# Patient Record
Sex: FEMALE | Race: WHITE | NOT HISPANIC OR LATINO | Employment: OTHER | URBAN - METROPOLITAN AREA
[De-identification: names, ages, dates, MRNs, and addresses within clinical notes are randomized per-mention and may not be internally consistent; named-entity substitution may affect disease eponyms.]

---

## 2017-02-28 ENCOUNTER — HOSPITAL ENCOUNTER (OUTPATIENT)
Dept: RADIOLOGY | Age: 82
Discharge: HOME | End: 2017-02-28
Payer: MEDICARE

## 2017-02-28 ENCOUNTER — HOSPITAL ENCOUNTER (OUTPATIENT)
Dept: RADIOLOGY | Age: 82
Discharge: HOME | End: 2017-02-28
Admitting: FAMILY MEDICINE
Payer: MEDICARE

## 2017-02-28 ENCOUNTER — TRANSCRIBE ORDERS (OUTPATIENT)
Dept: URGENT CARE | Age: 82
End: 2017-02-28

## 2017-02-28 ENCOUNTER — OFFICE VISIT (OUTPATIENT)
Dept: URGENT CARE | Age: 82
End: 2017-02-28
Payer: MEDICARE

## 2017-02-28 DIAGNOSIS — S99.912A INJURY OF LEFT ANKLE: ICD-10-CM

## 2017-02-28 PROCEDURE — 73630 X-RAY EXAM OF FOOT: CPT

## 2017-02-28 PROCEDURE — G0463 HOSPITAL OUTPT CLINIC VISIT: HCPCS | Performed by: FAMILY MEDICINE

## 2017-02-28 PROCEDURE — 99203 OFFICE O/P NEW LOW 30 MIN: CPT | Performed by: FAMILY MEDICINE

## 2017-02-28 PROCEDURE — 73610 X-RAY EXAM OF ANKLE: CPT

## 2019-05-28 ENCOUNTER — TRANSCRIBE ORDERS (OUTPATIENT)
Dept: LAB | Facility: CLINIC | Age: 84
End: 2019-05-28

## 2019-05-28 ENCOUNTER — APPOINTMENT (OUTPATIENT)
Dept: LAB | Facility: CLINIC | Age: 84
End: 2019-05-28
Payer: MEDICARE

## 2019-05-28 DIAGNOSIS — I10 ESSENTIAL HYPERTENSION, MALIGNANT: ICD-10-CM

## 2019-05-28 DIAGNOSIS — Z79.899 ENCOUNTER FOR LONG-TERM (CURRENT) USE OF OTHER MEDICATIONS: ICD-10-CM

## 2019-05-28 DIAGNOSIS — E78.5 HYPERLIPIDEMIA, UNSPECIFIED HYPERLIPIDEMIA TYPE: ICD-10-CM

## 2019-05-28 DIAGNOSIS — E10.9 TYPE 1 DIABETES MELLITUS WITHOUT COMPLICATION (HCC): Primary | ICD-10-CM

## 2019-05-28 DIAGNOSIS — E10.9 TYPE 1 DIABETES MELLITUS WITHOUT COMPLICATION (HCC): ICD-10-CM

## 2019-05-28 LAB
ALBUMIN SERPL BCP-MCNC: 3.8 G/DL (ref 3.5–5)
ALP SERPL-CCNC: 80 U/L (ref 46–116)
ALT SERPL W P-5'-P-CCNC: 28 U/L (ref 12–78)
ANION GAP SERPL CALCULATED.3IONS-SCNC: 5 MMOL/L (ref 4–13)
AST SERPL W P-5'-P-CCNC: 16 U/L (ref 5–45)
BILIRUB SERPL-MCNC: 0.61 MG/DL (ref 0.2–1)
BUN SERPL-MCNC: 14 MG/DL (ref 5–25)
CALCIUM SERPL-MCNC: 8.9 MG/DL (ref 8.3–10.1)
CHLORIDE SERPL-SCNC: 108 MMOL/L (ref 100–108)
CO2 SERPL-SCNC: 29 MMOL/L (ref 21–32)
CREAT SERPL-MCNC: 0.72 MG/DL (ref 0.6–1.3)
EST. AVERAGE GLUCOSE BLD GHB EST-MCNC: 169 MG/DL
GFR SERPL CREATININE-BSD FRML MDRD: 76 ML/MIN/1.73SQ M
GLUCOSE P FAST SERPL-MCNC: 143 MG/DL (ref 65–99)
HBA1C MFR BLD: 7.5 % (ref 4.2–6.3)
POTASSIUM SERPL-SCNC: 3.9 MMOL/L (ref 3.5–5.3)
PROT SERPL-MCNC: 7.3 G/DL (ref 6.4–8.2)
SODIUM SERPL-SCNC: 142 MMOL/L (ref 136–145)

## 2019-05-28 PROCEDURE — 80053 COMPREHEN METABOLIC PANEL: CPT

## 2019-05-28 PROCEDURE — 83036 HEMOGLOBIN GLYCOSYLATED A1C: CPT

## 2019-05-28 PROCEDURE — 36415 COLL VENOUS BLD VENIPUNCTURE: CPT

## 2019-09-06 ENCOUNTER — OFFICE VISIT (OUTPATIENT)
Dept: URGENT CARE | Age: 84
End: 2019-09-06
Payer: MEDICARE

## 2019-09-06 VITALS
DIASTOLIC BLOOD PRESSURE: 72 MMHG | HEART RATE: 63 BPM | WEIGHT: 127 LBS | HEIGHT: 61 IN | OXYGEN SATURATION: 96 % | TEMPERATURE: 98.4 F | SYSTOLIC BLOOD PRESSURE: 186 MMHG | BODY MASS INDEX: 23.98 KG/M2

## 2019-09-06 DIAGNOSIS — S59.901A ELBOW INJURY, RIGHT, INITIAL ENCOUNTER: Primary | ICD-10-CM

## 2019-09-06 DIAGNOSIS — S09.90XA INJURY OF HEAD, INITIAL ENCOUNTER: ICD-10-CM

## 2019-09-06 PROCEDURE — 99213 OFFICE O/P EST LOW 20 MIN: CPT | Performed by: FAMILY MEDICINE

## 2019-09-06 PROCEDURE — G0463 HOSPITAL OUTPT CLINIC VISIT: HCPCS | Performed by: FAMILY MEDICINE

## 2019-09-06 RX ORDER — ASPIRIN 81 MG/1
TABLET ORAL
COMMUNITY
End: 2020-07-02 | Stop reason: HOSPADM

## 2019-09-06 RX ORDER — ESCITALOPRAM OXALATE 10 MG/1
10 TABLET ORAL DAILY
Refills: 0 | COMMUNITY
Start: 2019-07-06

## 2019-09-06 RX ORDER — AMLODIPINE BESYLATE 5 MG/1
10 TABLET ORAL DAILY
Refills: 0 | Status: ON HOLD | COMMUNITY
Start: 2019-08-19 | End: 2020-07-02 | Stop reason: SDUPTHER

## 2019-09-06 RX ORDER — CALCIUM CARBONATE/VITAMIN D3 500-10/5ML
LIQUID (ML) ORAL
COMMUNITY

## 2019-09-06 RX ORDER — FELODIPINE 5 MG/1
TABLET, EXTENDED RELEASE ORAL
COMMUNITY
End: 2020-07-02 | Stop reason: HOSPADM

## 2019-09-06 RX ORDER — LISINOPRIL 40 MG/1
40 TABLET ORAL 2 TIMES DAILY
COMMUNITY
End: 2020-07-02 | Stop reason: HOSPADM

## 2019-09-06 RX ORDER — ATORVASTATIN CALCIUM 40 MG/1
TABLET, FILM COATED ORAL
COMMUNITY

## 2019-09-06 RX ORDER — METOPROLOL SUCCINATE 25 MG/1
TABLET, EXTENDED RELEASE ORAL
COMMUNITY

## 2019-09-06 RX ORDER — CLOPIDOGREL BISULFATE 75 MG/1
TABLET ORAL
COMMUNITY
End: 2020-07-02 | Stop reason: HOSPADM

## 2019-09-06 NOTE — PROGRESS NOTES
[facility]        NAME: Annette Egan is a 80 y o  female  : 1931    MRN: [id number]  DATE: 2019  TIME: 8:01 PM    Assessment and Plan   Elbow injury, right, initial encounter [D02 901A]  1  Elbow injury, right, initial encounter  Transfer to other facility   2  Injury of head, initial encounter  Transfer to other facility         Patient Instructions     Due to patient hitting head during fall and use of Plavix recommend patient go to ER for evaluation  Son will drive her  Patient would like to go to [facility] ER  Chief Complaint     Chief Complaint   Patient presents with    Elbow Injury     Shital Ramirez this morning in her living room, right elbow injury  History of Present Illness       Patient presents for evaluation of elbow pain after a fall this morning  She states she was cleaning up her coffee table and she lost balance and fell hitting her head and elbow  She was dizzy initially after the injury but is not dizzy not  She denies any headaches, visual disturbances  According to some her confusion is baseline for her  She complains of no pain in her head  She complains of right elbow pain since the fall  She is right handed  She has not been able to use her arm much all day  She has a caretaker that lives with her and they called her son this evening stating she was in a lot of pain  She took an ibuprofen and put ice on it until he got there  Her son states she was crying due to the pain  In the office tonight she states her pain is better  She denies any numbness or tingling  She denies pain elsewhere  Her pain is over the radial head  She is on Plavix due to a history of a stroke  She took her BP medication on the way over  Review of Systems   Review of Systems   Constitutional: Negative for chills and fever  HENT: Negative  Eyes: Negative for photophobia and visual disturbance  Respiratory: Negative for cough, shortness of breath and wheezing  Cardiovascular: Negative for chest pain  Musculoskeletal: Positive for arthralgias and joint swelling  Negative for neck pain  No head pain   Skin: Positive for color change  Bruise  on elbow   Neurological: Negative for dizziness, weakness, light-headedness, numbness and headaches  Psychiatric/Behavioral: Negative  Negative for confusion  Current Medications       Current Outpatient Medications:     amLODIPine (NORVASC) 5 mg tablet, Take 5 mg by mouth daily, Disp: , Rfl: 0    aspirin (ECOTRIN LOW STRENGTH) 81 mg EC tablet, Take by mouth, Disp: , Rfl:     atorvastatin (LIPITOR) 40 mg tablet, Take by mouth, Disp: , Rfl:     Calcium Carb-Cholecalciferol (CALTRATE 600+D) 600-800 MG-UNIT TABS, Take by mouth, Disp: , Rfl:     clopidogrel (PLAVIX) 75 mg tablet, Take by mouth, Disp: , Rfl:     escitalopram (LEXAPRO) 10 mg tablet, Take 10 mg by mouth daily, Disp: , Rfl: 0    felodipine (PLENDIL) 5 mg 24 hr tablet, Take by mouth, Disp: , Rfl:     lisinopril (ZESTRIL) 40 mg tablet, Take by mouth, Disp: , Rfl:     Magnesium Oxide 400 MG CAPS, Take by mouth, Disp: , Rfl:     metoprolol succinate (TOPROL-XL) 25 mg 24 hr tablet, Take by mouth, Disp: , Rfl:     Current Allergies     Allergies as of 09/06/2019    (No Known Allergies)            The following portions of the patient's history were reviewed and updated as appropriate: allergies, current medications, past family history, past medical history, past social history, past surgical history and problem list      Past Medical History:   Diagnosis Date    High cholesterol     Hypertension        History reviewed  No pertinent surgical history  History reviewed  No pertinent family history  Medications have been verified          Objective   BP (!) 186/72 (BP Location: Left arm, Patient Position: Sitting, Cuff Size: Standard)   Pulse 63   Temp 98 4 °F (36 9 °C)   Ht 5' 1" (1 549 m)   Wt 57 6 kg (127 lb)   SpO2 96%   BMI 24 00 kg/m²        Physical Exam     Physical Exam   Constitutional: She is oriented to person, place, and time  She appears well-developed and well-nourished  No distress  HENT:   Head: Normocephalic  Mouth/Throat: Oropharynx is clear and moist    Eyes: Pupils are equal, round, and reactive to light  EOM are normal    Neck: Normal range of motion  Cardiovascular: Normal rate and regular rhythm  No murmur heard  Pulmonary/Chest: Effort normal and breath sounds normal  No respiratory distress  She has no wheezes  She has no rales  Musculoskeletal: She exhibits edema and tenderness  Edema, ecchymosis over lateral elbow  Full elbow ROM with pain  Sensation median, ulnar, radial nerves intact  Palpable radial pulse     Neurological: She is alert and oriented to person, place, and time  No cranial nerve deficit or sensory deficit  Coordination normal    Skin: Skin is warm and dry  She is not diaphoretic  Psychiatric: She has a normal mood and affect  Her behavior is normal    Nursing note and vitals reviewed

## 2019-11-22 ENCOUNTER — APPOINTMENT (OUTPATIENT)
Dept: LAB | Facility: CLINIC | Age: 84
End: 2019-11-22
Payer: MEDICARE

## 2019-11-22 ENCOUNTER — TRANSCRIBE ORDERS (OUTPATIENT)
Dept: LAB | Facility: CLINIC | Age: 84
End: 2019-11-22

## 2019-11-22 DIAGNOSIS — I10 ESSENTIAL HYPERTENSION, MALIGNANT: ICD-10-CM

## 2019-11-22 DIAGNOSIS — E78.5 HYPERLIPIDEMIA, UNSPECIFIED HYPERLIPIDEMIA TYPE: ICD-10-CM

## 2019-11-22 DIAGNOSIS — Z79.899 ENCOUNTER FOR LONG-TERM (CURRENT) USE OF OTHER MEDICATIONS: ICD-10-CM

## 2019-11-22 DIAGNOSIS — E13.69 OTHER SPECIFIED DIABETES MELLITUS WITH OTHER SPECIFIED COMPLICATION, UNSPECIFIED WHETHER LONG TERM INSULIN USE (HCC): Primary | ICD-10-CM

## 2019-11-22 DIAGNOSIS — E13.69 OTHER SPECIFIED DIABETES MELLITUS WITH OTHER SPECIFIED COMPLICATION, UNSPECIFIED WHETHER LONG TERM INSULIN USE (HCC): ICD-10-CM

## 2019-11-22 LAB
ALBUMIN SERPL BCP-MCNC: 3.9 G/DL (ref 3.5–5)
ALP SERPL-CCNC: 85 U/L (ref 46–116)
ALT SERPL W P-5'-P-CCNC: 24 U/L (ref 12–78)
ANION GAP SERPL CALCULATED.3IONS-SCNC: 6 MMOL/L (ref 4–13)
AST SERPL W P-5'-P-CCNC: 14 U/L (ref 5–45)
BACTERIA UR QL AUTO: ABNORMAL /HPF
BILIRUB SERPL-MCNC: 0.57 MG/DL (ref 0.2–1)
BILIRUB UR QL STRIP: NEGATIVE
BUN SERPL-MCNC: 13 MG/DL (ref 5–25)
CALCIUM SERPL-MCNC: 9.7 MG/DL (ref 8.3–10.1)
CHLORIDE SERPL-SCNC: 107 MMOL/L (ref 100–108)
CHOLEST SERPL-MCNC: 180 MG/DL (ref 50–200)
CK SERPL-CCNC: 59 U/L (ref 26–192)
CLARITY UR: CLEAR
CO2 SERPL-SCNC: 30 MMOL/L (ref 21–32)
COLOR UR: YELLOW
CREAT SERPL-MCNC: 0.67 MG/DL (ref 0.6–1.3)
CREAT UR-MCNC: 147 MG/DL
EST. AVERAGE GLUCOSE BLD GHB EST-MCNC: 163 MG/DL
GFR SERPL CREATININE-BSD FRML MDRD: 79 ML/MIN/1.73SQ M
GLUCOSE P FAST SERPL-MCNC: 140 MG/DL (ref 65–99)
GLUCOSE UR STRIP-MCNC: NEGATIVE MG/DL
HBA1C MFR BLD: 7.3 % (ref 4.2–6.3)
HDLC SERPL-MCNC: 50 MG/DL
HGB UR QL STRIP.AUTO: ABNORMAL
KETONES UR STRIP-MCNC: NEGATIVE MG/DL
LDLC SERPL CALC-MCNC: 115 MG/DL (ref 0–100)
LEUKOCYTE ESTERASE UR QL STRIP: ABNORMAL
MICROALBUMIN UR-MCNC: 21 MG/L (ref 0–20)
MICROALBUMIN/CREAT 24H UR: 14 MG/G CREATININE (ref 0–30)
NITRITE UR QL STRIP: NEGATIVE
NON-SQ EPI CELLS URNS QL MICRO: ABNORMAL /HPF
NONHDLC SERPL-MCNC: 130 MG/DL
PH UR STRIP.AUTO: 7 [PH]
POTASSIUM SERPL-SCNC: 4.4 MMOL/L (ref 3.5–5.3)
PROT SERPL-MCNC: 7.5 G/DL (ref 6.4–8.2)
PROT UR STRIP-MCNC: NEGATIVE MG/DL
RBC #/AREA URNS AUTO: ABNORMAL /HPF
SODIUM SERPL-SCNC: 143 MMOL/L (ref 136–145)
SP GR UR STRIP.AUTO: 1.02 (ref 1–1.03)
TRIGL SERPL-MCNC: 74 MG/DL
UROBILINOGEN UR QL STRIP.AUTO: 0.2 E.U./DL
WBC #/AREA URNS AUTO: ABNORMAL /HPF

## 2019-11-22 PROCEDURE — 81001 URINALYSIS AUTO W/SCOPE: CPT

## 2019-11-22 PROCEDURE — 82043 UR ALBUMIN QUANTITATIVE: CPT

## 2019-11-22 PROCEDURE — 83036 HEMOGLOBIN GLYCOSYLATED A1C: CPT

## 2019-11-22 PROCEDURE — 80061 LIPID PANEL: CPT

## 2019-11-22 PROCEDURE — 82570 ASSAY OF URINE CREATININE: CPT

## 2019-11-22 PROCEDURE — 82550 ASSAY OF CK (CPK): CPT

## 2019-11-22 PROCEDURE — 80053 COMPREHEN METABOLIC PANEL: CPT

## 2019-11-22 PROCEDURE — 36415 COLL VENOUS BLD VENIPUNCTURE: CPT

## 2020-07-01 ENCOUNTER — HOSPITAL ENCOUNTER (EMERGENCY)
Facility: HOSPITAL | Age: 85
End: 2020-07-01
Attending: EMERGENCY MEDICINE
Payer: MEDICARE

## 2020-07-01 ENCOUNTER — HOSPITAL ENCOUNTER (INPATIENT)
Facility: HOSPITAL | Age: 85
LOS: 1 days | Discharge: HOME HEALTH | DRG: 087 | End: 2020-07-02
Attending: SURGERY | Admitting: SURGERY
Payer: MEDICARE

## 2020-07-01 ENCOUNTER — APPOINTMENT (EMERGENCY)
Dept: CT IMAGING | Facility: HOSPITAL | Age: 85
End: 2020-07-01
Payer: MEDICARE

## 2020-07-01 ENCOUNTER — APPOINTMENT (EMERGENCY)
Dept: RADIOLOGY | Facility: HOSPITAL | Age: 85
End: 2020-07-01
Payer: MEDICARE

## 2020-07-01 VITALS
HEART RATE: 66 BPM | SYSTOLIC BLOOD PRESSURE: 146 MMHG | RESPIRATION RATE: 16 BRPM | BODY MASS INDEX: 23.98 KG/M2 | WEIGHT: 126.98 LBS | TEMPERATURE: 98.3 F | OXYGEN SATURATION: 95 % | HEIGHT: 61 IN | DIASTOLIC BLOOD PRESSURE: 65 MMHG

## 2020-07-01 DIAGNOSIS — S06.350A: Primary | ICD-10-CM

## 2020-07-01 DIAGNOSIS — S06.330A: Primary | ICD-10-CM
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
## 2020-07-01 PROBLEM — S06.32AA INTRAPARENCHYMAL HEMATOMA OF LEFT SIDE OF BRAIN DUE TO TRAUMA: Status: ACTIVE | Noted: 2020-07-01

## 2020-07-01 LAB
ALBUMIN SERPL BCP-MCNC: 3.9 G/DL (ref 3.5–5)
ALP SERPL-CCNC: 92 U/L (ref 46–116)
ALT SERPL W P-5'-P-CCNC: 22 U/L (ref 12–78)
ANION GAP SERPL CALCULATED.3IONS-SCNC: 8 MMOL/L (ref 4–13)
APTT PPP: 26 SECONDS (ref 23–37)
AST SERPL W P-5'-P-CCNC: 21 U/L (ref 5–45)
ATRIAL RATE: 66 BPM
BACTERIA UR QL AUTO: ABNORMAL /HPF
BASOPHILS # BLD AUTO: 0.07 THOUSANDS/ΜL (ref 0–0.1)
BASOPHILS NFR BLD AUTO: 1 % (ref 0–1)
BILIRUB SERPL-MCNC: 0.61 MG/DL (ref 0.2–1)
BILIRUB UR QL STRIP: ABNORMAL
BUN SERPL-MCNC: 13 MG/DL (ref 5–25)
CALCIUM SERPL-MCNC: 9.6 MG/DL (ref 8.3–10.1)
CHLORIDE SERPL-SCNC: 100 MMOL/L (ref 100–108)
CLARITY UR: CLEAR
CO2 SERPL-SCNC: 28 MMOL/L (ref 21–32)
COLOR UR: YELLOW
CREAT SERPL-MCNC: 0.81 MG/DL (ref 0.6–1.3)
EOSINOPHIL # BLD AUTO: 0.14 THOUSAND/ΜL (ref 0–0.61)
EOSINOPHIL NFR BLD AUTO: 2 % (ref 0–6)
ERYTHROCYTE [DISTWIDTH] IN BLOOD BY AUTOMATED COUNT: 13.6 % (ref 11.6–15.1)
GFR SERPL CREATININE-BSD FRML MDRD: 65 ML/MIN/1.73SQ M
GLUCOSE SERPL-MCNC: 198 MG/DL (ref 65–140)
GLUCOSE UR STRIP-MCNC: NEGATIVE MG/DL
HCT VFR BLD AUTO: 42.2 % (ref 34.8–46.1)
HGB BLD-MCNC: 13.6 G/DL (ref 11.5–15.4)
HGB UR QL STRIP.AUTO: NEGATIVE
IMM GRANULOCYTES # BLD AUTO: 0.03 THOUSAND/UL (ref 0–0.2)
IMM GRANULOCYTES NFR BLD AUTO: 0 % (ref 0–2)
INR PPP: 1.04 (ref 0.84–1.19)
KETONES UR STRIP-MCNC: ABNORMAL MG/DL
LEUKOCYTE ESTERASE UR QL STRIP: ABNORMAL
LYMPHOCYTES # BLD AUTO: 2.02 THOUSANDS/ΜL (ref 0.6–4.47)
LYMPHOCYTES NFR BLD AUTO: 26 % (ref 14–44)
MCH RBC QN AUTO: 30 PG (ref 26.8–34.3)
MCHC RBC AUTO-ENTMCNC: 32.2 G/DL (ref 31.4–37.4)
MCV RBC AUTO: 93 FL (ref 82–98)
MONOCYTES # BLD AUTO: 0.65 THOUSAND/ΜL (ref 0.17–1.22)
MONOCYTES NFR BLD AUTO: 8 % (ref 4–12)
NEUTROPHILS # BLD AUTO: 4.92 THOUSANDS/ΜL (ref 1.85–7.62)
NEUTS SEG NFR BLD AUTO: 63 % (ref 43–75)
NITRITE UR QL STRIP: NEGATIVE
NON-SQ EPI CELLS URNS QL MICRO: ABNORMAL /HPF
NRBC BLD AUTO-RTO: 0 /100 WBCS
P AXIS: 40 DEGREES
PH UR STRIP.AUTO: 6 [PH] (ref 4.5–8)
PLATELET # BLD AUTO: 262 THOUSANDS/UL (ref 149–390)
PMV BLD AUTO: 9.1 FL (ref 8.9–12.7)
POTASSIUM SERPL-SCNC: 3.9 MMOL/L (ref 3.5–5.3)
PR INTERVAL: 174 MS
PROT SERPL-MCNC: 7.7 G/DL (ref 6.4–8.2)
PROT UR STRIP-MCNC: ABNORMAL MG/DL
PROTHROMBIN TIME: 13 SECONDS (ref 11.6–14.5)
QRS AXIS: 37 DEGREES
QRSD INTERVAL: 84 MS
QT INTERVAL: 394 MS
QTC INTERVAL: 413 MS
RBC # BLD AUTO: 4.53 MILLION/UL (ref 3.81–5.12)
RBC #/AREA URNS AUTO: ABNORMAL /HPF
SODIUM SERPL-SCNC: 136 MMOL/L (ref 136–145)
SP GR UR STRIP.AUTO: 1.02 (ref 1–1.03)
T WAVE AXIS: 38 DEGREES
TROPONIN I SERPL-MCNC: <0.02 NG/ML
UROBILINOGEN UR QL STRIP.AUTO: 0.2 E.U./DL
VENTRICULAR RATE: 66 BPM
WBC # BLD AUTO: 7.83 THOUSAND/UL (ref 4.31–10.16)
WBC #/AREA URNS AUTO: ABNORMAL /HPF

## 2020-07-01 PROCEDURE — 1123F ACP DISCUSS/DSCN MKR DOCD: CPT | Performed by: NEUROLOGICAL SURGERY

## 2020-07-01 PROCEDURE — 71046 X-RAY EXAM CHEST 2 VIEWS: CPT

## 2020-07-01 PROCEDURE — 85730 THROMBOPLASTIN TIME PARTIAL: CPT | Performed by: PHYSICIAN ASSISTANT

## 2020-07-01 PROCEDURE — 85025 COMPLETE CBC W/AUTO DIFF WBC: CPT | Performed by: PHYSICIAN ASSISTANT

## 2020-07-01 PROCEDURE — 36415 COLL VENOUS BLD VENIPUNCTURE: CPT | Performed by: PHYSICIAN ASSISTANT

## 2020-07-01 PROCEDURE — 85610 PROTHROMBIN TIME: CPT | Performed by: PHYSICIAN ASSISTANT

## 2020-07-01 PROCEDURE — 87040 BLOOD CULTURE FOR BACTERIA: CPT | Performed by: PHYSICIAN ASSISTANT

## 2020-07-01 PROCEDURE — 93010 ELECTROCARDIOGRAM REPORT: CPT | Performed by: INTERNAL MEDICINE

## 2020-07-01 PROCEDURE — 70450 CT HEAD/BRAIN W/O DYE: CPT

## 2020-07-01 PROCEDURE — 80053 COMPREHEN METABOLIC PANEL: CPT | Performed by: PHYSICIAN ASSISTANT

## 2020-07-01 PROCEDURE — 81001 URINALYSIS AUTO W/SCOPE: CPT

## 2020-07-01 PROCEDURE — 99285 EMERGENCY DEPT VISIT HI MDM: CPT | Performed by: PHYSICIAN ASSISTANT

## 2020-07-01 PROCEDURE — 99285 EMERGENCY DEPT VISIT HI MDM: CPT

## 2020-07-01 PROCEDURE — 99221 1ST HOSP IP/OBS SF/LOW 40: CPT | Performed by: SURGERY

## 2020-07-01 PROCEDURE — 93005 ELECTROCARDIOGRAM TRACING: CPT

## 2020-07-01 PROCEDURE — 84484 ASSAY OF TROPONIN QUANT: CPT | Performed by: PHYSICIAN ASSISTANT

## 2020-07-01 RX ORDER — B-COMPLEX WITH VITAMIN C
1 TABLET ORAL
Status: DISCONTINUED | OUTPATIENT
Start: 2020-07-02 | End: 2020-07-02 | Stop reason: HOSPADM

## 2020-07-01 RX ORDER — OXYCODONE HYDROCHLORIDE 5 MG/1
2.5 TABLET ORAL EVERY 4 HOURS PRN
Status: DISCONTINUED | OUTPATIENT
Start: 2020-07-01 | End: 2020-07-02 | Stop reason: HOSPADM

## 2020-07-01 RX ORDER — DOCUSATE SODIUM 100 MG/1
100 CAPSULE, LIQUID FILLED ORAL 2 TIMES DAILY
Status: DISCONTINUED | OUTPATIENT
Start: 2020-07-01 | End: 2020-07-02 | Stop reason: HOSPADM

## 2020-07-01 RX ORDER — AMOXICILLIN 250 MG
1 CAPSULE ORAL 2 TIMES DAILY
Status: DISCONTINUED | OUTPATIENT
Start: 2020-07-01 | End: 2020-07-02 | Stop reason: HOSPADM

## 2020-07-01 RX ORDER — METOPROLOL SUCCINATE 25 MG/1
25 TABLET, EXTENDED RELEASE ORAL DAILY
Status: DISCONTINUED | OUTPATIENT
Start: 2020-07-02 | End: 2020-07-02 | Stop reason: HOSPADM

## 2020-07-01 RX ORDER — ATORVASTATIN CALCIUM 40 MG/1
40 TABLET, FILM COATED ORAL
Status: DISCONTINUED | OUTPATIENT
Start: 2020-07-02 | End: 2020-07-02 | Stop reason: HOSPADM

## 2020-07-01 RX ORDER — ACETAMINOPHEN 325 MG/1
975 TABLET ORAL EVERY 8 HOURS SCHEDULED
Status: DISCONTINUED | OUTPATIENT
Start: 2020-07-01 | End: 2020-07-02 | Stop reason: HOSPADM

## 2020-07-01 RX ORDER — ONDANSETRON 2 MG/ML
4 INJECTION INTRAMUSCULAR; INTRAVENOUS EVERY 6 HOURS PRN
Status: DISCONTINUED | OUTPATIENT
Start: 2020-07-01 | End: 2020-07-02 | Stop reason: HOSPADM

## 2020-07-01 RX ORDER — AMLODIPINE BESYLATE 10 MG/1
10 TABLET ORAL DAILY
Status: DISCONTINUED | OUTPATIENT
Start: 2020-07-02 | End: 2020-07-02 | Stop reason: HOSPADM

## 2020-07-01 RX ORDER — ESCITALOPRAM OXALATE 10 MG/1
10 TABLET ORAL DAILY
Status: DISCONTINUED | OUTPATIENT
Start: 2020-07-02 | End: 2020-07-02 | Stop reason: HOSPADM

## 2020-07-01 RX ADMIN — DESMOPRESSIN ACETATE 17.2 MCG: 4 SOLUTION INTRAVENOUS at 17:37

## 2020-07-01 NOTE — ED NOTES
Patient ambulated to bathroom with assistance x1  No complaints, steady gait, no immediate distress noted       April Rodolfo Goldstein RN  07/01/20 1156       April JODEE Escobar RN  07/01/20 0806

## 2020-07-01 NOTE — ED NOTES
PACS called - 2pm pickup time to the ED for Trauma  Dr Bandar Escalona accepting   Call [address], RN  07/01/20 1800

## 2020-07-01 NOTE — ED PROVIDER NOTES
History  Chief Complaint   Patient presents with    Fall     pt states was dizzy fell last night hit L side of forehead and eye pt is on a blood thinner     80year-old female presents to the emergency department after a fall  States she fell yesterday in her home but does not remember the details leading to the fall  Bedside states that he went over there after she had fallen she had been complaining of some back pain and seemed unsteady on her feet  Notes some bruising on the right side of her forehead above the left eye  Denies any headaches at this time  No chest pain presently  Had complained of some mild pain in the middle of her back yesterday which has since resolved  History provided by:  Patient and relative   used: No    Fall   Mechanism of injury: fall    Injury location:  Head/neck  Head/neck injury location:  Head  Incident location:  Home  Time since incident:  1 day  Arrived directly from scene: no    Fall:     Fall occurred:  Unable to [employer] of impact:  Unable to specify  Suspicion of alcohol use: no    Suspicion of drug use: no    Tetanus status:  Unknown  Associated symptoms: back pain    Associated symptoms: no abdominal pain, no chest pain, no headaches, no nausea, no seizures and no vomiting        Prior to Admission Medications   Prescriptions Last Dose Informant Patient Reported? Taking?    Calcium Carb-Cholecalciferol (CALTRATE 600+D) 600-800 MG-UNIT TABS   Yes No   Sig: Take by mouth   Magnesium Oxide 400 MG CAPS   Yes No   Sig: Take by mouth   amLODIPine (NORVASC) 5 mg tablet   Yes No   Sig: Take 5 mg by mouth daily   aspirin (ECOTRIN LOW STRENGTH) 81 mg EC tablet   Yes No   Sig: Take by mouth   atorvastatin (LIPITOR) 40 mg tablet   Yes No   Sig: Take by mouth   clopidogrel (PLAVIX) 75 mg tablet   Yes No   Sig: Take by mouth   escitalopram (LEXAPRO) 10 mg tablet   Yes No   Sig: Take 10 mg by mouth daily   felodipine (PLENDIL) 5 mg 24 hr tablet Yes No   Sig: Take by mouth   lisinopril (ZESTRIL) 40 mg tablet   Yes No   Sig: Take by mouth   metoprolol succinate (TOPROL-XL) 25 mg 24 hr tablet   Yes No   Sig: Take by mouth      Facility-Administered Medications: None       Past Medical History:   Diagnosis Date    High cholesterol     Hypertension        History reviewed  No pertinent surgical history  History reviewed  No pertinent family history  I have reviewed and agree with the history as documented  E-Cigarette/Vaping     E-Cigarette/Vaping Substances     Social History     Tobacco Use    Smoking status: Never Smoker    Smokeless tobacco: Never Used   Substance Use Topics    Alcohol use: Never     Frequency: Never    Drug use: Never       Review of Systems   Constitutional: Negative for activity change, appetite change, chills and fever  HENT: Negative for congestion, dental problem, drooling, ear discharge, ear pain, mouth sores, nosebleeds, rhinorrhea, sore throat and trouble swallowing  Eyes: Negative for pain, discharge and itching  Respiratory: Negative for cough, chest tightness, shortness of breath and wheezing  Cardiovascular: Negative for chest pain and palpitations  Gastrointestinal: Negative for abdominal pain, blood in stool, constipation, diarrhea, nausea and vomiting  Endocrine: Negative for cold intolerance and heat intolerance  Genitourinary: Negative for difficulty urinating, dysuria, flank pain, frequency and urgency  Musculoskeletal: Positive for back pain  Skin: Positive for color change (bruising)  Negative for rash and wound  Allergic/Immunologic: Negative for food allergies and immunocompromised state  Neurological: Positive for dizziness  Negative for seizures, syncope, weakness, numbness and headaches  Psychiatric/Behavioral: Negative for agitation, behavioral problems and confusion  Physical Exam  Physical Exam   Constitutional: She is oriented to person, place, and time   She appears well-developed and well-nourished  No distress  HENT:   Head: Normocephalic and atraumatic  Right Ear: External ear normal    Left Ear: External ear normal    Mouth/Throat: Oropharynx is clear and moist  No oropharyngeal exudate  Eyes: Pupils are equal, round, and reactive to light  Conjunctivae and EOM are normal    Neck: No JVD present  No tracheal deviation present  Cardiovascular: Normal rate, regular rhythm and normal heart sounds  Exam reveals no gallop and no friction rub  No murmur heard  Pulmonary/Chest: Effort normal and breath sounds normal  No respiratory distress  She has no wheezes  She has no rales  She exhibits no tenderness  Musculoskeletal: Normal range of motion  She exhibits no edema, tenderness or deformity  No midline cervical, thoracic, or lumbar tenderness  Lymphadenopathy:     She has no cervical adenopathy  Neurological: She is alert and oriented to person, place, and time  Skin: Skin is warm and dry  No rash noted  She is not diaphoretic  No erythema  Psychiatric: She has a normal mood and affect  Her behavior is normal    Nursing note and vitals reviewed        Vital Signs  ED Triage Vitals   Temperature Pulse Respirations Blood Pressure SpO2   07/01/20 1110 07/01/20 1110 07/01/20 1110 07/01/20 1110 07/01/20 1110   98 3 °F (36 8 °C) 70 16 164/75 97 %      Temp Source Heart Rate Source Patient Position - Orthostatic VS BP Location FiO2 (%)   07/01/20 1110 07/01/20 1302 07/01/20 1302 07/01/20 1302 --   Oral Monitor Lying Right arm       Pain Score       07/01/20 1110       No Pain           Vitals:    07/01/20 1110 07/01/20 1302   BP: 164/75 141/67   Pulse: 70 62   Patient Position - Orthostatic VS:  Lying         Visual Acuity      ED Medications  Medications - No data to display    Diagnostic Studies  Results Reviewed     Procedure Component Value Units Date/Time    CBC and differential [[id number]] Collected:  07/01/20 1204    Lab Status:  Final result Specimen: Blood from Arm, Left Updated:  07/01/20 1240     WBC 7 83 Thousand/uL      RBC 4 53 Million/uL      Hemoglobin 13 6 g/dL      Hematocrit 42 2 %      MCV 93 fL      MCH 30 0 pg      MCHC 32 2 g/dL      RDW 13 6 %      MPV 9 1 fL      Platelets 485 Thousands/uL      nRBC 0 /100 WBCs      Neutrophils Relative 63 %      Immat GRANS % 0 %      Lymphocytes Relative 26 %      Monocytes Relative 8 %      Eosinophils Relative 2 %      Basophils Relative 1 %      Neutrophils Absolute 4 92 Thousands/µL      Immature Grans Absolute 0 03 Thousand/uL      Lymphocytes Absolute 2 02 Thousands/µL      Monocytes Absolute 0 65 Thousand/µL      Eosinophils Absolute 0 14 Thousand/µL      Basophils Absolute 0 07 Thousands/µL     Comprehensive metabolic panel [[id number]]  (Abnormal) Collected:  07/01/20 1204    Lab Status:  Final result Specimen:  Blood from Arm, Left Updated:  07/01/20 1239     Sodium 136 mmol/L      Potassium 3 9 mmol/L      Chloride 100 mmol/L      CO2 28 mmol/L      ANION GAP 8 mmol/L      BUN 13 mg/dL      Creatinine 0 81 mg/dL      Glucose 198 mg/dL      Calcium 9 6 mg/dL      AST 21 U/L      ALT 22 U/L      Alkaline Phosphatase 92 U/L      Total Protein 7 7 g/dL      Albumin 3 9 g/dL      Total Bilirubin 0 61 mg/dL      eGFR 65 ml/min/1 73sq m     Narrative:       [place] guidelines for Chronic Kidney Disease (CKD):     Stage 1 with normal or high GFR (GFR > 90 mL/min/1 73 square meters)    Stage 2 Mild CKD (GFR = 60-89 mL/min/1 73 square meters)    Stage 3A Moderate CKD (GFR = 45-59 mL/min/1 73 square meters)    Stage 3B Moderate CKD (GFR = 30-44 mL/min/1 73 square meters)    Stage 4 Severe CKD (GFR = 15-29 mL/min/1 73 square meters)    Stage 5 End Stage CKD (GFR <15 mL/min/1 73 square meters)  Note: GFR calculation is accurate only with a steady state creatinine    Troponin I [[id number]]  (Normal) Collected:  07/01/20 1204    Lab Status:  Final result Specimen:  Blood from Arm, Left Updated:  07/01/20 1236     Troponin I <0 02 ng/mL     Protime-INR [[id number]]  (Normal) Collected:  07/01/20 1204    Lab Status:  Final result Specimen:  Blood from Arm, Left Updated:  07/01/20 1233     Protime 13 0 seconds      INR 1 04    APTT [[id number]]  (Normal) Collected:  07/01/20 1204    Lab Status:  Final result Specimen:  Blood from Arm, Left Updated:  07/01/20 1233     PTT 26 seconds     Urine Microscopic [[id number]] Collected:  07/01/20 1215    Lab Status: In process Specimen:  Urine, Clean Catch Updated:  07/01/20 1219    Blood culture #1 [[id number]] Collected:  07/01/20 1209    Lab Status: In process Specimen:  Blood from Arm, Right Updated:  07/01/20 1215    Urine Macroscopic, POC [[id number]]  (Abnormal) Collected:  07/01/20 1226    Lab Status:  Final result Specimen:  Urine Updated:  07/01/20 1212     Color, UA Yellow     Clarity, UA Clear     pH, UA 6 0     Leukocytes, UA Trace     Nitrite, UA Negative     Protein, UA Trace mg/dl      Glucose, UA Negative mg/dl      Ketones, UA Trace mg/dl      Urobilinogen, UA 0 2 E U /dl      Bilirubin, UA Interference- unable to analyze     Blood, UA Negative     Specific Gravity, UA 1 025    Narrative:       CLINITEK RESULT    Blood culture #2 [[id number]] Collected:  07/01/20 1204    Lab Status: In process Specimen:  Blood from Arm, Left Updated:  07/01/20 1211                 CT head without contrast   Final Result by Charleen Boyer MD (07/01 1256)      Tiny hemorrhagic contusion in the peripheral left occipital lobe  No mass effect  Diffuse atrophy with chronic small vessel ischemic changes                 I personally discussed this study with RADHA PEREZ on 7/1/2020 at 12:55 PM                            Workstation performed: [id number]         XR chest 2 views    (Results Pending)              Procedures  ECG 12 Lead Documentation Only  Date/Time: 7/1/2020 12:10 PM  Performed by: Wilberto Reyna PA-C  Authorized by: Wilberto Reyna PA-C Indications / Diagnosis:  Dizziness  ECG reviewed by me, the ED Provider: yes    Patient location:  ED  Previous ECG:     Previous ECG:  Unavailable  Interpretation:     Interpretation: normal    Rate:     ECG rate:  66    ECG rate assessment: normal    Rhythm:     Rhythm: sinus rhythm    Ectopy:     Ectopy: none    QRS:     QRS axis:  Normal    QRS intervals:  Normal  Conduction:     Conduction: normal    ST segments:     ST segments:  Normal  T waves:     T waves: normal               ED Course       US AUDIT      Most Recent Value   Initial Alcohol Screen: US AUDIT-C    1  How often do you have a drink containing alcohol? 1 Filed at: 07/01/2020 1111   2  How many drinks containing alcohol do you have on a typical day you are drinking? 0 Filed at: 07/01/2020 1111   Audit-C Score  1 Filed at: 07/01/2020 1111                  JENNIFER/DAST-10      Most Recent Value   How many times in the past year have you    Used an illegal drug or used a prescription medication for non-medical reasons? Never Filed at: 07/01/2020 1112                                MDM  Number of Diagnoses or Management Options  Fall, initial encounter:   Focal contusion of occipital lobe [facility]):   Diagnosis management comments: Differential diagnosis includes but not limited to:  TIA, CVA, closed-head injury, intracranial injury, acute coronary syndrome, coronary event           Amount and/or Complexity of Data Reviewed  Clinical lab tests: ordered and reviewed  Tests in the radiology section of CPT®: ordered and reviewed  Discuss the patient with other providers: yes          Disposition  Final diagnoses:   Focal contusion of occipital lobe ([address] )   Fall, initial encounter     Time reflects when diagnosis was documented in both MDM as applicable and the Disposition within this note     Time User Action Codes Description Comment    7/1/2020  1:07 PM Leonie Servin Focal contusion of occipital lobe ([facility] [address] )     7/1/2020  1:07 PM Annette Gonzales Add [N15  Thaddeus Spencer, initial encounter       ED Disposition     ED Disposition Condition Date/Time Comment    Transfer to [id number] Jul 1, 2020  1:07 PM Zaid Leary should be transferred out to [facility] and has been medically cleared  MD Documentation      Most Recent Value   Patient Condition  The patient has been stabilized such that within reasonable medical probability, no material deterioration of the patient condition or the condition of the unborn child(sharon) is likely to result from the transfer   Reason for Transfer  Level of Care needed not available at this facility   Benefits of Transfer  Specialized equipment and/or services available at the receiving facility (Include comment)________________________   Risks of Transfer  Potential for delay in receiving treatment, Loss of IV   Accepting Physician  Dr Deysi Steeleh Name, Brenda Cabrera   Sending MD Konstantin Perdue PA-C,  Dr Jenni Valentine   Provider Certification  General risk, such as traffic hazards, adverse weather conditions, rough terrain or turbulence, possible failure of equipment (including vehicle or aircraft), or consequences of actions of persons outside the control of the transport personnel      RN Documentation      Most [address] [facility] Name, [address]   SLB      Follow-up Information    None         Patient's Medications   Discharge Prescriptions    No medications on file     No discharge procedures on file      PDMP Review     None          ED Provider  Electronically Signed by           Malia Thomas PA-C  07/01/20 4146

## 2020-07-01 NOTE — H&P
H&P Exam - Trauma   Luda Justin 80 y o  female MRN: [id number]  Unit/Bed#: [facility] 626-01 Encounter: [id number]    Assessment/Plan   Trauma Alert: Transfer on 7/01/20 from [address] [place] [address]: Ambulance   Trauma Team: Anabella Maurer   Consultants: Neurosurg     Trauma Active Problems:   -Left occipital intraparenchymal hemorrhage     Trauma Plan:   -DDAVP for plavix and ASA anticoag reversal in setting of intraparenchymal bleed   -Neurosurg consult, plan to see in the morning      Chief Complaint:     History of Present Illness   HPI:  Luda Justin is a 80 y o  female who presents as a trauma transfer for left occipital intraparenchymal hemorrhage after a fall reportedly on 6/30  Evelyn Mariam yesterday and facial ecchymosis and small occipital intrparenchymla hemmorage (6-7mm) on ASA and plavix  She is not oriented to time, but has some component of baseline dementia  The remainder of her examination and intial evaluation is unremarkable  Discussed the patient with her son who states that the patient has had increasing difficulty ambulating at home  He believes that she will likely need placement after her admission  Mechanism:Fall      12-point, complete review of systems was reviewed and negative except as stated above  Past Medical History:   Diagnosis Date    High cholesterol     Hypertension    -CVA     Surgical history: Appendectomy     History reviewed  No pertinent surgical history    Social History   Social History     Substance and Sexual Activity   Alcohol Use Never    Frequency: Never    Drinks per session: Patient refused    Binge frequency: Never     Social History     Substance and Sexual Activity   Drug Use Never     Social History     Tobacco Use   Smoking Status Never Smoker   Smokeless Tobacco Never Used     E-Cigarette/Vaping    E-Cigarette Use Never User      E-Cigarette/Vaping Substances    Nicotine No     THC No     CBD No     Flavoring No     Other No     Unknown No Tdap 1/26/20       Meds/Allergies   current meds:   Current Facility-Administered Medications   Medication Dose Route Frequency    acetaminophen (TYLENOL) tablet 975 mg  975 mg Oral Q8H [facility]    amLODIPine (NORVASC) tablet 10 mg  10 mg Oral Daily    atorvastatin (LIPITOR) tablet 40 mg  40 mg Oral Daily With Dinner    calcium carbonate-vitamin D (OSCAL-D) 500 mg-200 units per tablet 1 tablet  1 tablet Oral Daily With Breakfast    docusate sodium (COLACE) capsule 100 mg  100 mg Oral BID    escitalopram (LEXAPRO) tablet 10 mg  10 mg Oral Daily    insulin lispro (HumaLOG) 100 units/mL subcutaneous injection 1-5 Units  1-5 Units Subcutaneous 4 times day    metoprolol succinate (TOPROL-XL) 24 hr tablet 25 mg  25 mg Oral Daily    naloxone (NARCAN) 0 04 mg/mL syringe 0 04 mg  0 04 mg Intravenous Q1MIN PRN    ondansetron (ZOFRAN) injection 4 mg  4 mg Intravenous Q6H PRN    oxyCODONE (ROXICODONE) IR tablet 2 5 mg  2 5 mg Oral Q4H PRN    senna-docusate sodium (SENOKOT S) 8 6-50 mg per tablet 1 tablet  1 tablet Oral BID       No Known Allergies      PHYSICAL EXAM      Objective   Vitals:   First set: Temperature: 98 1 °F (36 7 °C) (07/01/20 1450)  Pulse: 64 (07/01/20 1450)  Respirations: 20 (07/01/20 1450)  Blood Pressure: 164/75 (07/01/20 1450)    Primary Survey:   (A) Airway: Intact   (B) Breathing: b/l breath sounds   (C) Circulation: Pulses:   normal  (D) Disabliity:  GCS 14 for slightly disoriented verbal response   (E) Expose:  Completed    Secondary Survey: (Click on Physical Exam tab above)  Physical Exam   Constitutional: She appears well-developed and well-nourished  No distress  HENT:   Right Ear: External ear normal    Left Ear: External ear normal    No hemotympanum bilaterally   Eyes: Pupils are equal, round, and reactive to light  Conjunctivae and EOM are normal    Left periorbital ecchymosis  Neck: Normal range of motion  Cardiovascular: Normal rate and regular rhythm     No murmur heard   Pulmonary/Chest: Effort normal  She has no wheezes  She has no rales  Abdominal: Soft  She exhibits no distension  There is no tenderness  Musculoskeletal: Normal range of motion  She exhibits no deformity  Denies any cervical, thoracic, lumbar, sacral tenderness to palpation  Neurological: She is alert  No cranial nerve deficit  Oriented to place, self, son  Not oriented to time  Full strength in bilateral upper and lower extremities  Normal sensation bilateral upper and lower extremities  Skin: Skin is warm  Psychiatric: She has a normal mood and affect  Nursing note and vitals reviewed        Invasive Devices     Peripheral Intravenous Line            Peripheral IV 07/01/20 Left Antecubital less than 1 day    Peripheral IV 07/01/20 Right Antecubital less than 1 day          Drain            External Urinary Catheter less than 1 day                Lab Results:   BMP/CMP:   Lab Results   Component Value Date    SODIUM 136 07/01/2020    K 3 9 07/01/2020     07/01/2020    CO2 28 07/01/2020    BUN 13 07/01/2020    CREATININE 0 81 07/01/2020    CALCIUM 9 6 07/01/2020    AST 21 07/01/2020    ALT 22 07/01/2020    ALKPHOS 92 07/01/2020    EGFR 65 07/01/2020     CBC and differential    Ref Range & Units 7/1/20 1204   WBC 4 31 - 10 16 Thousand/uL 7 83    RBC 3 81 - 5 12 Million/uL 4 53    Hemoglobin 11 5 - 15 4 g/dL 13 6    Hematocrit 34 8 - 46 1 % 42 2    MCV 82 - 98 fL 93    MCH 26 8 - 34 3 pg 30 0    MCHC 31 4 - 37 4 g/dL 32 2    RDW 11 6 - 15 1 % 13 6    MPV 8 9 - 12 7 fL 9 1    Platelets 354 - 511 Thousands/uL 262    nRBC /100 WBCs 0    Neutrophils Relative 43 - 75 % 63    Immat GRANS % 0 - 2 % 0    Lymphocytes Relative 14 - 44 % 26    Monocytes Relative 4 - 12 % 8    Eosinophils Relative 0 - 6 % 2    Basophils Relative 0 - 1 % 1    Neutrophils Absolute 1 85 - 7 62 Thousands/µL 4 92    Immature Grans Absolute 0 00 - 0 20 Thousand/uL 0 03    Lymphocytes Absolute 0 60 - 4 47 Thousands/µL 2 02    Monocytes Absolute 0 17 - 1 22 Thousand/µL 0 65    Eosinophils Absolute 0 00 - 0 61 Thousand/µL 0 14    Basophils Absolute 0 00 - 0 10 Thousands/µL 0 07            PT 13  INR 1 04  APTT 26    Trop < 02     7/01 blood cultures: pending     Imaging/EKG Studies:   7/01 CTH:   FINDINGS:     PARENCHYMA: Decreased attenuation is noted in periventricular and subcortical white matter demonstrating an appearance that is statistically most likely to represent moderate microangiopathic change  Chronic lacunar infarction in the left thalamus   noted      No CT signs of acute infarction  No intracranial mass, mass effect or midline shift  A small hemorrhagic contusion in the left peripheral occipital lobe (series 2, image 15) measures 7 x 6 mm  No mass effect or or cytotoxic edema      VENTRICLES AND EXTRA-AXIAL SPACES:  Normal for the patient's age      VISUALIZED ORBITS AND PARANASAL SINUSES:  Unremarkable orbits  Minimal air-fluid level in the right maxillary sinus      CALVARIUM AND EXTRACRANIAL SOFT TISSUES:  Normal      IMPRESSION:     Tiny hemorrhagic contusion in the peripheral left occipital lobe  No mass effect      Diffuse atrophy with chronic small vessel ischemic changes  7/01 CXR:   FINDINGS:  Lungs are adequately aerated  No lobar consolidation or large effusion      Cardiac size normal   No vascular congestion or peribronchial thickening  Atherosclerotic aorta      No pneumothorax or free air      Osseous structures appear within normal limits for patient age         IMPRESSION:  No acute cardiopulmonary disease            Code Status: Level 1 - Full Code  Advance Directive and Living Will:      Power of :    POLST:

## 2020-07-01 NOTE — EMTALA/ACUTE CARE TRANSFER
[address] [facility] [address]  Dept: [phone]      EMTALA TRANSFER CONSENT    NAME Mikie Elaine                                         1931                              MRN [id number]    I have been informed of my rights regarding examination, treatment, and transfer   by Dr Gurpreet Goodwin DO    Benefits: Specialized equipment and/or services available at the receiving facility (Include comment)________________________    Risks: Potential for delay in receiving treatment, Loss of IV      Consent for Transfer:  I acknowledge that my medical condition has been evaluated and explained to me by the emergency department physician or other qualified medical person and/or my attending physician, who has recommended that I be transferred to the service of  Accepting Physician: Dr Santos Listen at [address] [facility] Name, [address] : [facility]  The above potential benefits of such transfer, the potential risks associated with such transfer, and the probable risks of not being transferred have been explained to me, and I fully understand them  The doctor has explained that, in my case, the benefits of transfer outweigh the risks  I agree to be transferred  I authorize the performance of emergency medical procedures and treatments upon me in both transit and upon arrival at the receiving facility  Additionally, I authorize the release of any and all medical records to the receiving facility and request they be transported with me, if possible  I understand that the safest mode of transportation during a medical emergency is an ambulance and that the Hospital advocates the use of this mode of transport  Risks of traveling to the receiving facility by car, including absence of medical control, life sustaining equipment, such as oxygen, and medical personnel has been explained to me and I fully understand them      [facility] [facility]  [  ]  I consent to the stated transfer and to be transported by ambulance/helicopter  [  ]  I consent to the stated transfer, but refuse transportation by ambulance and accept full responsibility for my transportation by car  I understand the risks of non-ambulance transfers and I exonerate the Hospital and its staff from any deterioration in my condition that results from this refusal     X___________________________________________    DATE  20  TIME________  Signature of patient or legally responsible individual signing on patient behalf           RELATIONSHIP TO PATIENT_________________________          Provider Certification    NAME Taurus Sepulveda                                         1931                              MRN [id number]    A medical screening exam was performed on the above named patient  Based on the examination:    Condition Necessitating Transfer The primary encounter diagnosis was Focal contusion of occipital lobe ([address] )  A diagnosis of Fall, initial encounter was also pertinent to this visit  Patient Condition: The patient has been stabilized such that within reasonable medical probability, no material deterioration of the patient condition or the condition of the unborn child(sharon) is likely to result from the transfer    Reason for Transfer: Level of Care needed not available at this facility    Transfer Requirements: Facility [facility]   · Space available and qualified personnel available for treatment as acknowledged by    · Agreed to accept transfer and to provide appropriate medical treatment as acknowledged by       Dr Luis Freeman  · Appropriate medical records of the examination and treatment of the patient are provided at the time of transfer   [address] [facility], [address] _______  · Transfer will be performed by qualified personnel from    and appropriate transfer equipment as required, including the use of necessary and appropriate life support measures      Provider Certification: I have examined the patient and explained the following risks and benefits of being transferred/refusing transfer to the patient/family:  General risk, such as traffic hazards, adverse weather conditions, rough terrain or turbulence, possible failure of equipment (including vehicle or aircraft), or consequences of actions of persons outside the control of the transport personnel      Based on these reasonable risks and benefits to the patient and/or the unborn child(sharon), and based upon the information available at the time of the patients examination, I certify that the medical benefits reasonably to be expected from the provision of appropriate medical treatments at another medical facility outweigh the increasing risks, if any, to the individuals medical condition, and in the case of labor to the unborn child, from effecting the transfer      X____________________________________________ DATE 07/01/20        TIME_______      ORIGINAL - SEND TO MEDICAL RECORDS   COPY - SEND WITH PATIENT DURING TRANSFER

## 2020-07-02 ENCOUNTER — TELEPHONE (OUTPATIENT)
Dept: OTHER | Facility: HOSPITAL | Age: 85
End: 2020-07-02

## 2020-07-02 ENCOUNTER — APPOINTMENT (INPATIENT)
Dept: RADIOLOGY | Facility: HOSPITAL | Age: 85
DRG: 087 | End: 2020-07-02
Payer: MEDICARE

## 2020-07-02 ENCOUNTER — TELEPHONE (OUTPATIENT)
Dept: NEUROSURGERY | Facility: CLINIC | Age: 85
End: 2020-07-02

## 2020-07-02 VITALS
TEMPERATURE: 98.1 F | OXYGEN SATURATION: 95 % | BODY MASS INDEX: 23.6 KG/M2 | DIASTOLIC BLOOD PRESSURE: 97 MMHG | HEIGHT: 61 IN | WEIGHT: 125 LBS | SYSTOLIC BLOOD PRESSURE: 166 MMHG | RESPIRATION RATE: 16 BRPM | HEART RATE: 61 BPM
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
## 2020-07-02 PROBLEM — S00.83XA TRAUMATIC ECCHYMOSIS OF FACE: Status: ACTIVE | Noted: 2020-07-02

## 2020-07-02 LAB
ANION GAP SERPL CALCULATED.3IONS-SCNC: 5 MMOL/L (ref 4–13)
BASOPHILS # BLD AUTO: 0.07 THOUSANDS/ΜL (ref 0–0.1)
BASOPHILS NFR BLD AUTO: 1 % (ref 0–1)
BUN SERPL-MCNC: 10 MG/DL (ref 5–25)
CALCIUM SERPL-MCNC: 9.4 MG/DL (ref 8.3–10.1)
CHLORIDE SERPL-SCNC: 104 MMOL/L (ref 100–108)
CO2 SERPL-SCNC: 27 MMOL/L (ref 21–32)
CREAT SERPL-MCNC: 0.58 MG/DL (ref 0.6–1.3)
EOSINOPHIL # BLD AUTO: 0.2 THOUSAND/ΜL (ref 0–0.61)
EOSINOPHIL NFR BLD AUTO: 3 % (ref 0–6)
ERYTHROCYTE [DISTWIDTH] IN BLOOD BY AUTOMATED COUNT: 13.5 % (ref 11.6–15.1)
GFR SERPL CREATININE-BSD FRML MDRD: 83 ML/MIN/1.73SQ M
GLUCOSE SERPL-MCNC: 123 MG/DL (ref 65–140)
GLUCOSE SERPL-MCNC: 124 MG/DL (ref 65–140)
GLUCOSE SERPL-MCNC: 180 MG/DL (ref 65–140)
GLUCOSE SERPL-MCNC: 359 MG/DL (ref 65–140)
HCT VFR BLD AUTO: 37.3 % (ref 34.8–46.1)
HGB BLD-MCNC: 12.3 G/DL (ref 11.5–15.4)
IMM GRANULOCYTES # BLD AUTO: 0.02 THOUSAND/UL (ref 0–0.2)
IMM GRANULOCYTES NFR BLD AUTO: 0 % (ref 0–2)
LYMPHOCYTES # BLD AUTO: 2.66 THOUSANDS/ΜL (ref 0.6–4.47)
LYMPHOCYTES NFR BLD AUTO: 33 % (ref 14–44)
MCH RBC QN AUTO: 30.7 PG (ref 26.8–34.3)
MCHC RBC AUTO-ENTMCNC: 33 G/DL (ref 31.4–37.4)
MCV RBC AUTO: 93 FL (ref 82–98)
MONOCYTES # BLD AUTO: 0.66 THOUSAND/ΜL (ref 0.17–1.22)
MONOCYTES NFR BLD AUTO: 8 % (ref 4–12)
NEUTROPHILS # BLD AUTO: 4.49 THOUSANDS/ΜL (ref 1.85–7.62)
NEUTS SEG NFR BLD AUTO: 55 % (ref 43–75)
NRBC BLD AUTO-RTO: 0 /100 WBCS
PLATELET # BLD AUTO: 197 THOUSANDS/UL (ref 149–390)
PMV BLD AUTO: 9 FL (ref 8.9–12.7)
POTASSIUM SERPL-SCNC: 3.9 MMOL/L (ref 3.5–5.3)
RBC # BLD AUTO: 4.01 MILLION/UL (ref 3.81–5.12)
SODIUM SERPL-SCNC: 136 MMOL/L (ref 136–145)
WBC # BLD AUTO: 8.1 THOUSAND/UL (ref 4.31–10.16)

## 2020-07-02 PROCEDURE — 97167 OT EVAL HIGH COMPLEX 60 MIN: CPT

## 2020-07-02 PROCEDURE — 70450 CT HEAD/BRAIN W/O DYE: CPT

## 2020-07-02 PROCEDURE — 85025 COMPLETE CBC W/AUTO DIFF WBC: CPT | Performed by: EMERGENCY MEDICINE

## 2020-07-02 PROCEDURE — 99238 HOSP IP/OBS DSCHRG MGMT 30/<: CPT | Performed by: SURGERY

## 2020-07-02 PROCEDURE — 80048 BASIC METABOLIC PNL TOTAL CA: CPT | Performed by: EMERGENCY MEDICINE

## 2020-07-02 PROCEDURE — 99223 1ST HOSP IP/OBS HIGH 75: CPT | Performed by: INTERNAL MEDICINE

## 2020-07-02 PROCEDURE — 97163 PT EVAL HIGH COMPLEX 45 MIN: CPT

## 2020-07-02 PROCEDURE — 99223 1ST HOSP IP/OBS HIGH 75: CPT | Performed by: NEUROLOGICAL SURGERY

## 2020-07-02 PROCEDURE — NC001 PR NO CHARGE: Performed by: EMERGENCY MEDICINE

## 2020-07-02 PROCEDURE — 82948 REAGENT STRIP/BLOOD GLUCOSE: CPT

## 2020-07-02 RX ORDER — AMLODIPINE BESYLATE 5 MG/1
10 TABLET ORAL DAILY
Refills: 0
Start: 2020-07-02

## 2020-07-02 RX ORDER — ACETAMINOPHEN 325 MG/1
975 TABLET ORAL EVERY 8 HOURS SCHEDULED
Qty: 30 TABLET | Refills: 0 | Status: SHIPPED | OUTPATIENT
Start: 2020-07-02

## 2020-07-02 RX ORDER — DONEPEZIL HYDROCHLORIDE 5 MG/1
5 TABLET, FILM COATED ORAL
Qty: 30 TABLET | Refills: 0 | Status: SHIPPED | OUTPATIENT
Start: 2020-07-02 | End: 2020-12-29

## 2020-07-02 RX ORDER — OXYCODONE HYDROCHLORIDE 5 MG/1
2.5 TABLET ORAL EVERY 4 HOURS PRN
Qty: 15 TABLET | Refills: 0 | Status: SHIPPED | OUTPATIENT
Start: 2020-07-02 | End: 2020-07-12

## 2020-07-02 RX ADMIN — Medication 1 TABLET: at 09:27

## 2020-07-02 RX ADMIN — DOCUSATE SODIUM 100 MG: 100 CAPSULE, LIQUID FILLED ORAL at 09:27

## 2020-07-02 RX ADMIN — INSULIN LISPRO 3 UNITS: 100 INJECTION, SOLUTION INTRAVENOUS; SUBCUTANEOUS at 09:29

## 2020-07-02 RX ADMIN — INSULIN LISPRO 1 UNITS: 100 INJECTION, SOLUTION INTRAVENOUS; SUBCUTANEOUS at 12:06

## 2020-07-02 RX ADMIN — DOCUSATE SODIUM AND SENNOSIDES 1 TABLET: 8.6; 5 TABLET ORAL at 09:27

## 2020-07-02 RX ADMIN — METOPROLOL SUCCINATE 25 MG: 25 TABLET, EXTENDED RELEASE ORAL at 09:27

## 2020-07-02 RX ADMIN — AMLODIPINE BESYLATE 10 MG: 10 TABLET ORAL at 09:27

## 2020-07-02 RX ADMIN — ESCITALOPRAM OXALATE 10 MG: 10 TABLET ORAL at 09:26

## 2020-07-02 NOTE — ASSESSMENT & PLAN NOTE
Ground level fall on 06/30 secondary to ambulatory dysfunction  CT head 7/1 showed new 6-7 mm left occipital intraparenchymal hemorrhage  Takes aspirin and Plavix at home, received DDAVP  Neurosurgery consult  Not oriented to time, however has baseline dementia  no focal deficits

## 2020-07-02 NOTE — ASSESSMENT & PLAN NOTE
Small left occipital IPH s/p fall on ASA/Plavix  · Reversed with DDAVP  · Baseline dementia  · No HA, neurointact    Imaging:  · CT head w/o, 7/1/2020: Tiny hemorrhagic contusion in the peripheral left occipital lobe  No mass effect  · Ct head w/o, 7/2/2020: stable left occipital hemorrhagic contusion    Plan:  · Continue frequent neurological checks  · STAT CT head with any neurological decline including drop GCS of 2pts within 1 hr   · Recommend SBP <160mmHg  · No keppra at this time due to age, mental status, and small size of hemorrhage  · Hold all antiplatelet and anticoagulation medications  · Pain control per primary team  · Mobilize with PT/OT  · DVT PPX: SCDs, ok to initiate pharm ppx  · Ongoing medical management per primary team/trauma  · No neurosurgical intervention indicated at this juncture  Neurosurgery will sign off at this time  Patient should follow up in 2 weeks after repeat CT head  Hold ASA and plavix until repeat CTH  Please call with any questions or concerns

## 2020-07-02 NOTE — PROGRESS NOTES
Progress Note - Thuy Nicole 9/26/1931, 80 y o  female MRN: [id number]    Unit/Bed#: [facility] 626-01 Encounter: [id number]    Primary Care Provider: Hawa Madrigal MD   Date and time admitted to hospital: 7/1/2020  2:39 PM        Traumatic ecchymosis of face  Assessment & Plan  Patient has no complaint of pain at this time  Physical exam not concerning for fracture  Expectant management    Fall  Assessment & Plan  Increasing ambulatory dysfunction per son  PT OT consult, will likely require placement      * Intraparenchymal hematoma of left side of brain due to trauma [facility])  Assessment & Plan  Ground level fall on 06/30 secondary to ambulatory dysfunction  CT head 7/1 showed new 6-7 mm left occipital intraparenchymal hemorrhage  Takes aspirin and Plavix at home, received DDAVP  Neurosurgery consult  Not oriented to time, however has baseline dementia  no focal deficits      Progress Note - Guy Soto 80 y o  female [id number]   Unit/Bed#: [facility] 626-01 Encounter: [id number]     ASSESSMENT:   Patient Active Problem List   Diagnosis    Intraparenchymal hematoma of left side of brain due to trauma [facility])    Fall    Traumatic ecchymosis of face        PLAN:  As above    Disposition: likely d/c      SUBJECTIVE:  Chief Complaint: fall    Subjective: No acute events overnight  Pt has no complaints  OBJECTIVE:     Meds/Allergies   No current facility-administered medications for this encounter       Current Outpatient Medications:     acetaminophen (TYLENOL) 325 mg tablet, Take 3 tablets (975 mg total) by mouth every 8 (eight) hours, Disp: 30 tablet, Rfl: 0    amLODIPine (NORVASC) 5 mg tablet, Take 2 tablets (10 mg total) by mouth daily, Disp: , Rfl: 0    atorvastatin (LIPITOR) 40 mg tablet, Take by mouth , Disp: , Rfl:     Calcium Carb-Cholecalciferol (CALTRATE 600+D) 600-800 MG-UNIT TABS, Take by mouth, Disp: , Rfl:     donepezil (ARICEPT) 5 mg tablet, Take 1 tablet (5 mg total) by mouth daily at bedtime, Disp: 30 tablet, Rfl: 0    escitalopram (LEXAPRO) 10 mg tablet, Take 10 mg by mouth daily, Disp: , Rfl: 0    Magnesium Oxide 400 MG CAPS, Take by mouth, Disp: , Rfl:     metFORMIN (GLUCOPHAGE) 500 mg tablet, Take 500 mg by mouth daily with dinner, Disp: , Rfl:     metoprolol succinate (TOPROL-XL) 25 mg 24 hr tablet, Take by mouth, Disp: , Rfl:     oxyCODONE (ROXICODONE) 5 mg immediate release tablet, Take 0 5 tablets (2 5 mg total) by mouth every 4 (four) hours as needed for moderate pain for up to 10 daysMax Daily Amount: 15 mg, Disp: 15 tablet, Rfl: 0     Vitals:   Vitals:    07/02/20 1200   BP:    Pulse:    Resp:    Temp:    SpO2: 95%       Intake/Output:  I/O       06/30 0701 - 07/01 0700 07/01 0701 - 07/02 0700 07/02 0701 - 07/03 0700    P  O   0     IV Piggyback  1607     Total Intake(mL/kg)  1607 (28 3)     Urine (mL/kg/hr)  200 636 (3 9)    Total Output  200 636    Net  +1407 -636                  Nutrition/GI Proph/Bowel Reg: holding given brain bleed    Physical Exam:   Physical Exam   Constitutional: She appears well-developed and well-nourished  No distress  Frail appearing   HENT:   Head: Normocephalic  Nose: Nose normal    Mouth/Throat: Oropharynx is clear and moist    Left periorbital eccyhmosis   Eyes: Conjunctivae and EOM are normal  Right eye exhibits no discharge  Left eye exhibits no discharge  Neck: Normal range of motion  Cardiovascular: Normal rate  Pulmonary/Chest: Effort normal and breath sounds normal  No stridor  No respiratory distress  Abdominal: Soft  She exhibits no distension  There is no tenderness  There is no rebound and no guarding  Neurological: She is alert  No cranial nerve deficit  Skin: Skin is warm and dry  Capillary refill takes less than 2 seconds  She is not diaphoretic  Psychiatric: She has a normal mood and affect  Her behavior is normal    Pleasant    Nursing note and vitals reviewed          Invasive Devices     Drain External Urinary Catheter 4 days                 Lab Results: Results: I have personally reviewed pertinent reports  Imaging/EKG Studies: Results: I have personally reviewed pertinent reports      Other Studies:   VTE Prophylaxis:Reason for no pharmacologic prophylaxis brain bleed

## 2020-07-02 NOTE — PLAN OF CARE
Problem: PAIN - ADULT  Goal: Verbalizes/displays adequate comfort level or baseline comfort level  Description  Interventions:  - Encourage patient to monitor pain and request assistance  - Assess pain using appropriate pain scale  - Administer analgesics based on type and severity of pain and evaluate response  - Implement non-pharmacological measures as appropriate and evaluate response  - Consider cultural and social influences on pain and pain management  - Notify physician/advanced practitioner if interventions unsuccessful or patient reports new pain  Outcome: Progressing     Problem: INFECTION - ADULT  Goal: Absence or prevention of progression during hospitalization  Description  INTERVENTIONS:  - Assess and monitor for signs and symptoms of infection  - Monitor lab/diagnostic results  - Monitor all insertion sites, i e  indwelling lines, tubes, and drains  - Monitor endotracheal if appropriate and nasal secretions for changes in amount and color  - [place] appropriate cooling/warming therapies per order  - Administer medications as ordered  - Instruct and encourage patient and family to use good hand hygiene technique  - Identify and instruct in appropriate isolation precautions for identified infection/condition  Outcome: Progressing     Problem: SAFETY ADULT  Goal: Patient will remain free of falls  Description  INTERVENTIONS:  - Assess patient frequently for physical needs  -  Identify cognitive and physical deficits and behaviors that affect risk of falls    -  [place] fall precautions as indicated by assessment   - Educate patient/family on patient safety including physical limitations  - Instruct patient to call for assistance with activity based on assessment  - Modify environment to reduce risk of injury  - Consider OT/PT consult to assist with strengthening/mobility  Outcome: Progressing  Goal: Maintain or return to baseline ADL function  Description  INTERVENTIONS:  -  Assess patient's ability to carry out ADLs; assess patient's baseline for ADL function and identify physical deficits which impact ability to perform ADLs (bathing, care of mouth/teeth, toileting, grooming, dressing, etc )  - Assess/evaluate cause of self-care deficits   - Assess range of motion  - Assess patient's mobility; develop plan if impaired  - Assess patient's need for assistive devices and provide as appropriate  - Encourage maximum independence but intervene and supervise when necessary  - Involve family in performance of ADLs  - Assess for home care needs following discharge   - Consider OT consult to assist with ADL evaluation and planning for discharge  - Provide patient education as appropriate  Outcome: Progressing  Goal: Maintain or return mobility status to optimal level  Description  INTERVENTIONS:  - Assess patient's baseline mobility status (ambulation, transfers, stairs, etc )    - Identify cognitive and physical deficits and behaviors that affect mobility  - Identify mobility aids required to assist with transfers and/or ambulation (gait belt, sit-to-stand, lift, walker, cane, etc )  - [place] fall precautions as indicated by assessment  - Record patient progress and toleration of activity level on Mobility SBAR; progress patient to next Phase/Stage  - Instruct patient to call for assistance with activity based on assessment  - Consider rehabilitation consult to assist with strengthening/weightbearing, etc   Outcome: Progressing     Problem: DISCHARGE PLANNING  Goal: Discharge to home or other facility with appropriate resources  Description  INTERVENTIONS:  - Identify barriers to discharge w/patient and caregiver  - Arrange for needed discharge resources and transportation as appropriate  - Identify discharge learning needs (meds, wound care, etc )  - Arrange for interpretive services to assist at discharge as needed  - Refer to Case Management Department for coordinating discharge planning if the patient needs post-hospital services based on physician/advanced practitioner order or complex needs related to functional status, cognitive ability, or social support system  Outcome: Progressing     Problem: Knowledge Deficit  Goal: Patient/family/caregiver demonstrates understanding of disease process, treatment plan, medications, and discharge instructions  Description  Complete learning assessment and assess knowledge base    Interventions:  - Provide teaching at level of understanding  - Provide teaching via preferred learning methods  Outcome: Progressing

## 2020-07-02 NOTE — TELEPHONE ENCOUNTER
07/08/2020-CALLED PT AGAIN, CONFIRMED 07/16 CT AND 07/17 APT WITH PT       07/07/2020-CALLED PT TO CONFIRM 07/16/2020 APT AND TO SCHEDULE CT, PT  ASK TO CALL BACK TOMORROW BECAUSE PT WAS AT AN APT       07/06/2020-PT DISCHARGED TO HOME    07/02/2020-PT STILL IN HOSPITAL  07/16/2020 APT IN [facility] W/CT HEAD      ----- Message from Fabrizio Price PA-C sent at 7/2/2020  2:34 PM EDT -----  Can you schedule a 2 week hospital follow up with CT head prior?   Thanks!!

## 2020-07-02 NOTE — CONSULTS
Consult- Neal Felton 9/26/1931, 80 y o  female MRN: [id number]    Unit/Bed#: [facility] 626-01 Encounter: [id number]    Primary Care Provider: Josefina Cai MD   Date and time admitted to hospital: 7/1/2020  2:39 PM      Inpatient consult to Neurosurgery  Consult performed by: Clemente Alvares PA-C  Consult ordered by: Kathi Vargas MD      Consult completed 7/2/2020 at 2:15pm  Imaging personally reviewed with attending  Intraparenchymal hematoma of left side of brain due to trauma [facility])  Assessment & Plan  Small left occipital IPH s/p fall on ASA/Plavix  · Reversed with DDAVP  · Baseline dementia  · No HA, neurointact    Imaging:  · CT head w/o, 7/1/2020: Tiny hemorrhagic contusion in the peripheral left occipital lobe  No mass effect  · Ct head w/o, 7/2/2020: stable left occipital hemorrhagic contusion    Plan:  · Continue frequent neurological checks  · STAT CT head with any neurological decline including drop GCS of 2pts within 1 hr   · Recommend SBP <160mmHg  · No keppra at this time due to age, mental status, and small size of hemorrhage  · Hold all antiplatelet and anticoagulation medications  · Pain control per primary team  · Mobilize with PT/OT  · DVT PPX: SCDs, ok to initiate pharm ppx  · Ongoing medical management per primary team/trauma  · No neurosurgical intervention indicated at this juncture  Neurosurgery will sign off at this time  Patient should follow up in 2 weeks after repeat CT head  Hold ASA and plavix until repeat CTH  Please call with any questions or concerns  History of Present Illness     HPI: Neal Felton is a 80y o  year old female with PMH including h/o CVA on DAPT, HLD, HTN, baseline dementia who presents after a fall at home yesterday on ASA and plavix  She is oriented to her name only, but this is baseline for her according to chart review   Imaging in the ED shows a very small left occipital IPH without mass effect of vasogenic edema; repeat scans show stability of the hemorrhage  Currently she has no complaints  She is very pleasantly confused and has no recollection of how she fell  She denies HA, dizziness, weakness, pain  Review of Systems   Constitutional: Negative  HENT: Negative  Eyes: Negative  Respiratory: Negative  Negative for cough and chest tightness  Cardiovascular: Negative  Negative for chest pain  Gastrointestinal: Negative  Endocrine: Negative  Genitourinary: Negative  Musculoskeletal: Negative  Negative for back pain and neck pain  Skin: Positive for wound  Rash: left orbital ecchymosis  Neurological: Negative  Negative for dizziness, weakness and headaches  Hematological: Bruises/bleeds easily (on DAPT)  Psychiatric/Behavioral: Positive for confusion         Historical Information   Past Medical History:   Diagnosis Date    [facility]A [facility]cerebral vascular accident) ([facility] [address] )     High cholesterol     Hypertension      Past Surgical History:   Procedure Laterality Date    APPENDECTOMY       Social History     Substance and Sexual Activity   Alcohol Use Never    Frequency: Never    Drinks per session: Patient refused    Binge frequency: Never     Social History     Substance and Sexual Activity   Drug Use Never     Social History     Tobacco Use   Smoking Status Never Smoker   Smokeless Tobacco Never Used     Family History   Problem Relation Age of Onset    Dementia Father     Dementia Other        Meds/Allergies   all current active meds have been reviewed, current meds:   Current Facility-Administered Medications   Medication Dose Route Frequency    acetaminophen (TYLENOL) tablet 975 mg  975 mg Oral Q8H [facility]    amLODIPine (NORVASC) tablet 10 mg  10 mg Oral Daily    atorvastatin (LIPITOR) tablet 40 mg  40 mg Oral Daily With Dinner    calcium carbonate-vitamin D (OSCAL-D) 500 mg-200 units per tablet 1 tablet  1 tablet Oral Daily With Breakfast    docusate sodium (COLACE) capsule 100 mg  100 mg Oral BID    escitalopram (LEXAPRO) tablet 10 mg  10 mg Oral Daily    insulin lispro (HumaLOG) 100 units/mL subcutaneous injection 1-5 Units  1-5 Units Subcutaneous 4 times day    metoprolol succinate (TOPROL-XL) 24 hr tablet 25 mg  25 mg Oral Daily    naloxone (NARCAN) 0 04 mg/mL syringe 0 04 mg  0 04 mg Intravenous Q1MIN PRN    ondansetron (ZOFRAN) injection 4 mg  4 mg Intravenous Q6H PRN    oxyCODONE (ROXICODONE) IR tablet 2 5 mg  2 5 mg Oral Q4H PRN    senna-docusate sodium (SENOKOT S) 8 6-50 mg per tablet 1 tablet  1 tablet Oral BID    and PTA meds:   Prior to Admission Medications   Prescriptions Last Dose Informant Patient Reported? Taking? Calcium Carb-Cholecalciferol (CALTRATE 600+D) 600-800 MG-UNIT TABS   Yes No   Sig: Take by mouth   Magnesium Oxide 400 MG CAPS   Yes No   Sig: Take by mouth   amLODIPine (NORVASC) 5 mg tablet   Yes No   Sig: Take 10 mg by mouth daily    aspirin (ECOTRIN LOW STRENGTH) 81 mg EC tablet   Yes No   Sig: Take by mouth   atorvastatin (LIPITOR) 40 mg tablet   Yes No   Sig: Take by mouth    clopidogrel (PLAVIX) 75 mg tablet   Yes No   Sig: Take by mouth   escitalopram (LEXAPRO) 10 mg tablet   Yes No   Sig: Take 10 mg by mouth daily   felodipine (PLENDIL) 5 mg 24 hr tablet   Yes No   Sig: Take by mouth   lisinopril (ZESTRIL) 40 mg tablet   Yes No   Sig: Take 40 mg by mouth 2 (two) times a day    metFORMIN (GLUCOPHAGE) 500 mg tablet   Yes No   Sig: Take 500 mg by mouth daily with dinner   metoprolol succinate (TOPROL-XL) 25 mg 24 hr tablet   Yes No   Sig: Take by mouth      Facility-Administered Medications: None     No Known Allergies    Objective   I/O       06/30 0701 - 07/01 0700 07/01 0701 - 07/02 0700 07/02 0701 - 07/03 0700    P  O   0 120    IV Piggyback  1607     Total Intake(mL/kg)  1607 (28 3) 120 (2 1)    Urine (mL/kg/hr)  200 636 (1 5)    Total Output  200 636    Net  +1407 -516                 Physical Exam   Constitutional: She appears well-developed and well-nourished  HENT:   Head: Normocephalic  Left orbital ecchymosis   Eyes: Pupils are equal, round, and reactive to light  EOM are normal    Neck: Normal range of motion  Cardiovascular: Normal rate  Pulmonary/Chest: Effort normal  No respiratory distress  Abdominal: Soft  Musculoskeletal: Normal range of motion  Neurological: She is alert  She has normal strength  She has a normal Finger-Nose-Finger Test    Reflex Scores:       Tricep reflexes are 1+ on the right side and 1+ on the left side  Bicep reflexes are 1+ on the right side and 1+ on the left side  Brachioradialis reflexes are 1+ on the right side and 1+ on the left side  Patellar reflexes are 1+ on the right side and 1+ on the left side  Achilles reflexes are 1+ on the right side and 1+ on the left side  Skin: Skin is warm and dry  Psychiatric: She has a normal mood and affect  Her speech is normal and behavior is normal  Judgment and thought content normal    Nursing note and vitals reviewed  Neurologic Exam     Mental Status   Disoriented to person  Oriented to place  Oriented to time  Follows 1 step commands  Attention: normal  Concentration: normal    Speech: speech is normal   Level of consciousness: alert  Knowledge: good  Able to perform simple calculations  Able to name object  Able to repeat  Normal comprehension  Cranial Nerves   Cranial nerves II through XII intact  CN III, IV, VI   Pupils are equal, round, and reactive to light  Extraocular motions are normal      Motor Exam   Muscle bulk: decreased  Overall muscle tone: normal  Right arm pronator drift: absent  Left arm pronator drift: absent    Strength   Strength 5/5 throughout       Sensory Exam   Light touch normal      Gait, Coordination, and Reflexes     Coordination   Finger to nose coordination: normal    Tremor   Resting tremor: absent    Reflexes   Right brachioradialis: 1+  Left brachioradialis: 1+  Right biceps: 1+  Left biceps: 1+  Right triceps: 1+  Left triceps: 1+  Right patellar: 1+  Left patellar: 1+  Right achilles: 1+  Left achilles: 1+  Right Chandra: absent  Left Chandra: absent  Right ankle clonus: absent  Left ankle clonus: absent        Vitals:Blood pressure 166/97, pulse 61, temperature 98 1 °F (36 7 °C), resp  rate 16, height 5' 1" (1 549 m), weight 56 7 kg (125 lb), SpO2 95 %  ,Body mass index is 23 62 kg/m²  Lab Results:   Results from last 7 days   Lab Units 07/02/20  0523 07/01/20  1204   WBC Thousand/uL 8 10 7 83   HEMOGLOBIN g/dL 12 3 13 6   HEMATOCRIT % 37 3 42 2   PLATELETS Thousands/uL 197 262   NEUTROS PCT % 55 63   MONOS PCT % 8 8     Results from last 7 days   Lab Units 07/02/20  0523 07/01/20  1204   POTASSIUM mmol/L 3 9 3 9   CHLORIDE mmol/L 104 100   CO2 mmol/L 27 28   BUN mg/dL 10 13   CREATININE mg/dL 0 58* 0 81   CALCIUM mg/dL 9 4 9 6   ALK PHOS U/L  --  92   ALT U/L  --  22   AST U/L  --  21             Results from last 7 days   Lab Units 07/01/20  1204   INR  1 04   PTT seconds 26     No results found for: TROPONINT  ABG:No results found for: PHART, TCQ6YTM, PO2ART, WCB7DSS, C3XRFSNF, BEART, SOURCE    Imaging Studies: I have personally reviewed pertinent reports  and I have personally reviewed pertinent films in PACS    EKG, Pathology, and Other Studies: I have personally reviewed pertinent reports  VTE Prophylaxis: Sequential compression device Marisabel Sorensen     Code Status: Level 1 - Full Code  Advance Directive and Living Will:      Power of :    POLST:      Counseling / Coordination of Care  I spent 45 minutes with the patient

## 2020-07-02 NOTE — DISCHARGE INSTRUCTIONS
Neurosurgery discharge instructions following traumatic head bleed:      Do not take any blood thinning medications (ie  No Advil  No motrin  No ibuprofen  No Aleve  No Aspirin  No fishoil  No heparin  No antiplatelet / no anticoagulation medication)   Refrain from activity that increases chance of trauma to head or falls  Recommend you take fall precaution   No strenuous activity or sports   Return to hospital Emergency Room if you experience worsening / new headache, nausea/vomiting, speech/vision change, seizure, confusion / mental status change, weakness, or other neurological changes  Follow-up as scheduled with a repeat CT head without contrast to be completed 2-3 days prior to visit  Prescription has been entered electronically  Please call  to schedule

## 2020-07-02 NOTE — PLAN OF CARE
Problem: OCCUPATIONAL THERAPY ADULT  Goal: Performs self-care activities at highest level of function for planned discharge setting  See evaluation for individualized goals  Description  Treatment Interventions: ADL retraining, Functional transfer training, Endurance training, Cognitive reorientation, Patient/family training, Equipment evaluation/education, Compensatory technique education, Energy conservation, Activityengagement          See flowsheet documentation for full assessment, interventions and recommendations  Note:   Limitation: Decreased ADL status, Decreased Safe judgement during ADL, Decreased cognition, Decreased endurance, Decreased self-care trans, Decreased high-level ADLs  Prognosis: Guarded  Assessment: 79 YO Female SEEN FOR INITIAL OCCUPATIONAL THERAPY EVALUATION FOLLOWING TXF FROM SLT->SLB S/P FALL RESULTING IN INTRAPARENCHYMAL HEMATOMA AND FACIAL ECCHYMOSIS  PROBLEMS LIST INCLUDES HTN, HLD, AND COGNITIVE IMPAIRMENT  PT IS A POOR HISTORIAN, INFORMATION OBTAINED FROM PT'S CHART  PT IS FROM HOME ALONE WHERE S/O VISITS DAILY  PT IS INDEPENDENT WITH ADLS/LT IADLS  PT CURRENTLY REQUIRES OVERALL MIN A WITH ADLS, TRANSFERS AND FUNCTIONAL MOBILITY WITH USE OF RW  PT IS LIMITED 2' PAIN, FATIGUE, IMPAIRED BALANCE, FALL RISK , LIMITED SAFETY AWARENESS/INSIGHT/JUDGEMENT, DISORIENTATION, OVERALL WEAKNESS/DECONDITIONING , LIMITED FAMILY/FRIEND SUPPORT , INACCESSIBLE HOME ENVIRONMENT and OVERALL LIMITED ACTIVITY TOLERANCE  PT EDUCATED ON DEEP BREATHING TECHNIQUES T/O ACTIVITY, SLOWING OF PACE and CONTINUE PARTICIPATION IN SELF-CARE/MOBILITY WITH STAFF WHILE IN THE HOSPITAL   FROM AN OCCUPATIONAL THERAPY PERSPECTIVE, PT WOULD BENEFIT FROM ADDITIONAL OT SERVICES IN AN INPT REHAB SETTING UPON D/C- PT WOULD BENEFIT FROM FORMAL COGNITIVE ASSESSMENT PRIOR TO RETURNING HOME ALONE  WILL CONT TO FOLLOW TO ADDRESS THE BELOW DESCRIBED GOALS        OT Discharge Recommendation: Post-Acute Rehabilitation Services(VS PROBABLE 24 HOUR SUPERVISION PENDING FORMAL COG ASSESSMEN)  OT - OK to Discharge:  Yes

## 2020-07-02 NOTE — DISCHARGE SUMMARY
Discharge Summary - Génesis Crawley 80 y o  female MRN: [id number]    [facility] Encounter: [id number]    Admission Date:   Admission Orders (From admission, onward)     Ordered        07/01/20 1723  Inpatient Admission  Once                     Admitting Diagnosis: Multiple injuries [T07  XXXA]  Intraparenchymal hematoma of left side of brain due to trauma, without loss of consciousness[facility] [[id number]]    HPI: per resident:  CHELSEY French:  "Génesis Crawley is a 80 y o  female who presents as a trauma transfer for left occipital intraparenchymal hemorrhage after a fall reportedly on 6/30  Lyndal Val yesterday and facial ecchymosis and small occipital intrparenchymla hemmorage (6-7mm) on ASA and plavix  She is not oriented to time, but has some component of baseline dementia  The remainder of her examination and intial evaluation is unremarkable  Discussed the patient with her son who states that the patient has had increasing difficulty ambulating at home  He believes that she will likely need placement after her admission "    Procedures Performed: No orders of the defined types were placed in this encounter  Summary of Hospital Course: 79 y/o female admitted with interparenchymal hematoma  Doing well and working with Pt and OT  Clear for discharge home, No anticoagulants  Significant Findings, Care, Treatment and Services Provided: Xr Chest 2 Views    Result Date: 7/1/2020  Impression: No acute cardiopulmonary disease  Workstation performed: [id number]     Ct Head Wo Contrast    Result Date: 7/2/2020  Impression: Small subcortical hemorrhage left temporal occipital junction unchanged from prior study  Workstation performed: [id number]     Ct Head Without Contrast    Result Date: 7/1/2020  Impression: Tiny hemorrhagic contusion in the peripheral left occipital lobe  No mass effect  Diffuse atrophy with chronic small vessel ischemic changes     I personally discussed this study with RADHA PEREZ on 7/1/2020 at 12:55 PM  Workstation performed: [id number]       Complications: none    Discharge Diagnosis: S/P Fall  Intraparenchymal hematoma    Resolved Problems  Date Reviewed: 7/2/2020    None          Condition at Discharge: stable         Discharge instructions/Information to patient and family:   See after visit summary for information provided to patient and family  Provisions for Follow-Up Care:  See after visit summary for information related to follow-up care and any pertinent home health orders  PCP: Heidi Brown MD    Disposition: Home    Planned Readmission: No      Discharge Statement   I spent 27 minutes discharging the patient  This time was spent on the day of discharge  I had direct contact with the patient on the day of discharge  Additional documentation is required if more than 30 minutes were spent on discharge  Discharge Medications:  See after visit summary for reconciled discharge medications provided to patient and family

## 2020-07-02 NOTE — TELEPHONE ENCOUNTER
Called patient's son Jono Rios after recommendation by geriatric team to discuss ongoing management of home medications  Informed them to call the primary care provider on 07/03/2020 to confirm outpatient regimen  May be current was updated recommendations as per his discharge  Updated AVS after phone call  As well as readdressed the importance of not taking any blood thinners at this time and will discuss as an outpatient with Neurosurgery

## 2020-07-02 NOTE — PLAN OF CARE
Problem: PAIN - ADULT  Goal: Verbalizes/displays adequate comfort level or baseline comfort level  Description  Interventions:  - Encourage patient to monitor pain and request assistance  - Assess pain using appropriate pain scale  - Administer analgesics based on type and severity of pain and evaluate response  - Implement non-pharmacological measures as appropriate and evaluate response  - Consider cultural and social influences on pain and pain management  - Notify physician/advanced practitioner if interventions unsuccessful or patient reports new pain  Outcome: Progressing     Problem: INFECTION - ADULT  Goal: Absence or prevention of progression during hospitalization  Description  INTERVENTIONS:  - Assess and monitor for signs and symptoms of infection  - Monitor lab/diagnostic results  - Monitor all insertion sites, i e  indwelling lines, tubes, and drains  - Monitor endotracheal if appropriate and nasal secretions for changes in amount and color  - [place] appropriate cooling/warming therapies per order  - Administer medications as ordered  - Instruct and encourage patient and family to use good hand hygiene technique  - Identify and instruct in appropriate isolation precautions for identified infection/condition  Outcome: Progressing     Problem: SAFETY ADULT  Goal: Patient will remain free of falls  Description  INTERVENTIONS:  - Assess patient frequently for physical needs  -  Identify cognitive and physical deficits and behaviors that affect risk of falls    -  [place] fall precautions as indicated by assessment   - Educate patient/family on patient safety including physical limitations  - Instruct patient to call for assistance with activity based on assessment  - Modify environment to reduce risk of injury  - Consider OT/PT consult to assist with strengthening/mobility  Outcome: Progressing  Goal: Maintain or return to baseline ADL function  Description  INTERVENTIONS:  -  Assess patient's ability to carry out ADLs; assess patient's baseline for ADL function and identify physical deficits which impact ability to perform ADLs (bathing, care of mouth/teeth, toileting, grooming, dressing, etc )  - Assess/evaluate cause of self-care deficits   - Assess range of motion  - Assess patient's mobility; develop plan if impaired  - Assess patient's need for assistive devices and provide as appropriate  - Encourage maximum independence but intervene and supervise when necessary  - Involve family in performance of ADLs  - Assess for home care needs following discharge   - Consider OT consult to assist with ADL evaluation and planning for discharge  - Provide patient education as appropriate  Outcome: Progressing  Goal: Maintain or return mobility status to optimal level  Description  INTERVENTIONS:  - Assess patient's baseline mobility status (ambulation, transfers, stairs, etc )    - Identify cognitive and physical deficits and behaviors that affect mobility  - Identify mobility aids required to assist with transfers and/or ambulation (gait belt, sit-to-stand, lift, walker, cane, etc )  - [place] fall precautions as indicated by assessment  - Record patient progress and toleration of activity level on Mobility SBAR; progress patient to next Phase/Stage  - Instruct patient to call for assistance with activity based on assessment  - Consider rehabilitation consult to assist with strengthening/weightbearing, etc   Outcome: Progressing     Problem: DISCHARGE PLANNING  Goal: Discharge to home or other facility with appropriate resources  Description  INTERVENTIONS:  - Identify barriers to discharge w/patient and caregiver  - Arrange for needed discharge resources and transportation as appropriate  - Identify discharge learning needs (meds, wound care, etc )  - Arrange for interpretive services to assist at discharge as needed  - Refer to Case Management Department for coordinating discharge planning if the patient needs post-hospital services based on physician/advanced practitioner order or complex needs related to functional status, cognitive ability, or social support system  Outcome: Progressing     Problem: Knowledge Deficit  Goal: Patient/family/caregiver demonstrates understanding of disease process, treatment plan, medications, and discharge instructions  Description  Complete learning assessment and assess knowledge base  Interventions:  - Provide teaching at level of understanding  - Provide teaching via preferred learning methods  Outcome: Progressing     Problem: NEUROSENSORY - ADULT  Goal: Achieves stable or improved neurological status  Description  INTERVENTIONS  - Monitor and report changes in neurological status  - Monitor vital signs such as temperature, blood pressure, glucose, and any other labs ordered   - Initiate measures to prevent increased intracranial pressure  - Monitor for seizure activity and implement precautions if appropriate      Outcome: Progressing  Goal: Remains free of injury related to seizures activity  Description  INTERVENTIONS  - Maintain airway, patient safety  and administer oxygen as ordered  - Monitor patient for seizure activity, document and report duration and description of seizure to physician/advanced practitioner  - If seizure occurs,  ensure patient safety during seizure  - Reorient patient post seizure  - Seizure pads on all 4 side rails  - Instruct patient/family to notify RN of any seizure activity including if an aura is experienced  - Instruct patient/family to call for assistance with activity based on nursing assessment  - Administer anti-seizure medications if ordered    Outcome: Progressing  Goal: Achieves maximal functionality and self care  Description  INTERVENTIONS  - Monitor swallowing and airway patency with patient fatigue and changes in neurological status  - Encourage and assist patient to increase activity and self care     - Encourage visually impaired, hearing impaired and aphasic patients to use assistive/communication devices  Outcome: Progressing     Problem: Prexisting or High Potential for Compromised Skin Integrity  Goal: Skin integrity is maintained or improved  Description  INTERVENTIONS:  - Identify patients at risk for skin breakdown  - Assess and monitor skin integrity  - Assess and monitor nutrition and hydration status  - Monitor labs   - Assess for incontinence   - Turn and reposition patient  - Assist with mobility/ambulation  - Relieve pressure over bony prominences  - Avoid friction and shearing  - Provide appropriate hygiene as needed including keeping skin clean and dry  - Evaluate need for skin moisturizer/barrier cream  - Collaborate with interdisciplinary team   - Patient/family teaching  - Consider wound care consult   Outcome: Progressing

## 2020-07-02 NOTE — CONSULTS
Consultation - Geriatric Medicine   Yani Kebede 80 y o  female MRN: [id number]  [facility] Encounter: [id number]      Assessment/Plan     Ambulatory dysfunction with fall   -pt does not remember details of fall   -does not use assist device for ambulation at baseline   -continue fall precautions  -encourage assistance with all transfers  -encourage use of bed and chair alarms as patient is impulsive and high risk of falls  -consider home fall risk assessment  -consider personal fall alert system  -PT and OT pending    Left occipital intraparenchymal hematoma  -confirmed on CT head on admission  -s/p DDAVP on admission as pt takes Plavix as o/p  -AC/AP on hold  -Neurosurgery consult pending    L eye ecchymosis  -continue tx per trauma  -consider ice pack     Cognitive impairment   -Likely MCI vs early dementia, no prior cognitive testing available, will return later today to [facility]  -family inquiring about Donepezil, will discuss medication recommendations after cognitive screening can be completed   -CT head on admission revealed tiny hemorrhagic contusion and peripheral left occipital lobe as well as diffuse atrophy and chronic small-vessel changes  -recommend checking TSH/FT4 and B12  -encourage patient to remain physically, socially, and cognitively engaged    Impaired Vision  -uses glasses for reading, encourage use at all appropriate times  -encourage adequate lighting and encourage use of assistance with ambulation  -keep personal belongings close to person to avoid reaching  -encourage appropriate footwear at all times    Impaired mastication  -requires use of partial dentures, encourage use of appropriate times  -ensure meal consistency appropriate for abilities  -continue standard aspiration precautions    Urge incontinence  -given age and fall risk would not initiate antispasmodics at this time as may increased risk of falls and confusion specially in elderly patient population  -discussed bladder regimen and timed voiding at length at bedside including recommendation of q 2 hours voiding to reduce risk of incontinence episodes and rushing to restroom as this will increase her risk of falls    Deconditioning/debility/frailty  -multifactorial including age and multiple chronic medical comorbidities, clinical frailty scale stage 6, moderately frail  -continue to encourage nutritional support, patient reports good appetite  -continue good control chronic medical conditions    Delirium precautions  -Patient is high risk of delirium due to age, fall, hospitalization, and underlying cognitive impairment  -Initiate delirium precautions  -maintain normal sleep/wake cycle  -minimize overnight interruptions, group overnight vitals/labs/nursing checks as possible  -dim lights, close blinds and turn off tv to minimize stimulation and encourage sleep environment in evenings  -ensure that pain is well controlled   -monitor for fecal and urinary retention which may precipitate delirium  -encourage early mobilization and ambulation  -provide frequent reorientation and redirection  -encourage family and friends at the bedside to help help calm patient if anxious  -avoid medications which may precipitate or worsen delirium such as tramadol, benzodiazepine, anticholinergics, and benadryl  -encourage hydration and nutrition   -redirect unwanted behaviors as first line, avoid physical restraints, use chemical restraint only if all other attempts have been unsuccessful, would consider Zyprexa 2 5mg IM Q, monitor for orthostatic hypotension and QTc prolongation with repeat dosing, recommend lowest dose possible for shortest duration possible    Home medication review  Confirmed with [facility] ([phone]:    Metoprolol succinate 25 mg daily  Amlodipine 10 mg daily  Plavix 75 mg daily  Lexapro 10 mg daily   Lipitor 40 mg daily    History of Present Illness   Physician Requesting Consult: Lamont Cloud Prince Clayton DO  Reason for Consult / Principal Problem: Fall  Hx and PE limited by: N/A  Additional history obtained from: Agustín Vargas (son) at bedside    HPI: Mikie Elaine is a 80y o  year old female with HTN, HLD, impaired mastication, deconditioning and debility, impaired vision, and cognitive impairment who is admitted to trauma following unwitnessed fall at home on Tuesday 6/30/20 and found to have intraparenchymal hematoma of brain  She is being seen in consultation by Geriatrics for fall and cognitive impairment  She is seen and examined at the bedside where she is sitting resting comfortably with her son at her side who assists with HPI as she is a poor historian  He reports that she lives at home and has a significant other who often stays with her, when he returned from taking the trash out last evening he found her face down on the ground in front of the couch, she was uncertain of what happened but reported that she thinks she fell  She does not remember the events today  She denies any acute pain  Her son explains that she has some forgetfulness which is much worse over the past few months especially since COVID and limited socialization as he has noted a rapid decline and that she is requiring more assistance with ADLs/iADLs  He inquires about memory medications such as Donepezil as his daughter who is a pharmacist locally has been requesting that she be started on it but he has not yet seen her PCP to discuss due to [place] and they have been trying to keep her quarantined for safety  Prior to admission she was residing at home with her significant other, does not use assist device for ambulation, has no other recent falls  She uses glasses for reading, partial dentures and does not use hearing aids  She has urinary urge incontinence and uses adult briefs  She is in good spirits most of the time and is satisfied with her life      Inpatient consult to Gerontology  Consult performed by: Jeniffer Tobias, DO  Consult ordered by: Apoorva Nuno PA-C        Review of Systems   Constitutional: Positive for activity change (more sedentary since COVID)  Negative for chills and fever  HENT: Positive for dental problem (partials)  Negative for hearing loss and trouble swallowing  Eyes: Positive for visual disturbance (glasses for reading)  Respiratory: Negative for cough, chest tightness, shortness of breath and wheezing  Cardiovascular: Negative for chest pain and palpitations  Gastrointestinal: Negative for abdominal pain, nausea and vomiting  Endocrine: Negative  Genitourinary: Positive for frequency and urgency  Negative for difficulty urinating  Musculoskeletal: Positive for gait problem  Negative for arthralgias and back pain  Skin: Negative  Allergic/Immunologic: Negative  Neurological: Positive for weakness  Negative for dizziness, light-headedness and headaches  Hematological: Bruises/bleeds easily  Psychiatric/Behavioral: Positive for confusion (new baseline per son)  Negative for sleep disturbance  The patient is not nervous/anxious  All other systems reviewed and are negative      Historical Information   Past Medical History:   Diagnosis Date    CVA (cerebral vascular accident) ([address] )     High cholesterol     Hypertension      Past Surgical History:   Procedure Laterality Date    APPENDECTOMY       Social History   Social History     Substance and Sexual Activity   Alcohol Use Never    Frequency: Never    Drinks per session: Patient refused    Binge frequency: Never     Social History     Substance and Sexual Activity   Drug Use Never     Social History     Tobacco Use   Smoking Status Never Smoker   Smokeless Tobacco Never Used     Family History:   Family History   Problem Relation Age of Onset    Dementia Father      Meds/Allergies   all current active meds have been reviewed    No Known Allergies    Objective     Intake/Output Summary (Last 24 hours) at 7/2/2020 [facility] filed at 7/2/2020 0930  Gross per 24 hour   Intake 1727 ml   Output 836 ml   Net 891 ml     Invasive Devices     Peripheral Intravenous Line            Peripheral IV 07/01/20 Left Antecubital less than 1 day    Peripheral IV 07/01/20 Right Antecubital less than 1 day          Drain            External Urinary Catheter less than 1 day              Physical Exam   Constitutional: She appears well-developed  Thin, frail, elderly appearing female sitting in chair side bed resting comfortably   HENT:   Head: Normocephalic  Mouth/Throat: Oropharynx is clear and moist  No oropharyngeal exudate  Left periorbital ecchymosis   Eyes: Conjunctivae are normal  Right eye exhibits no discharge  Left eye exhibits no discharge  No scleral icterus  Neck: Neck supple  No tracheal deviation present  Cardiovascular: Normal rate and intact distal pulses  No murmur heard  Pulmonary/Chest: Effort normal and breath sounds normal  No respiratory distress  She has no wheezes  Abdominal: Soft  Bowel sounds are normal  There is no tenderness  Musculoskeletal: She exhibits no edema or tenderness  Reduced overall muscle mass   Neurological: She is alert  Awake and alert, oriented person and place   Skin: Skin is warm and dry  Left periorbital ecchymosis   Psychiatric: She has a normal mood and affect  Nursing note and vitals reviewed      Lab Results:   I have personally reviewed pertinent lab results including the following:  -sodium 136, potassium 3 9, chloride 104, CO2 27, BUN 10, creatinine 0 58, glucose 123, calcium 9 4, GFR 83  -hemoglobin 12 3, hematocrit 37 3, WBC 8 1, platelets 988    I have personally reviewed the following imaging study reports in PACS:    CT head without contrast 07/01/2020:  Tiny hemorrhagic contusion in peripheral left occipital lobe with no mass effect, diffuse atrophy and chronic small-vessel ischemic changes, on personal review moderate chronic microangiopathic changes appreciated  Chest x-ray 07/01/2020:  No acute cardiopulmonary disease    Therapies:   PT:  Pending  OT:  Pending    VTE Prophylaxis: Reason for no pharmacologic prophylaxis Intraparenchymal hematoma    Code Status: Level 1 - Full Code  Advance Directive and Living Will:      Power of :    POLST:      Family and Social Support:   Living Arrangements: Children  Support Systems: Family members  Assistance Needed: n/a  Type of Current Residence: Private residence  Current Home Care Services: No    Goals of Care:  Improve memory

## 2020-07-02 NOTE — ASSESSMENT & PLAN NOTE
Patient has no complaint of pain at this time  Physical exam not concerning for fracture  Expectant management

## 2020-07-02 NOTE — OCCUPATIONAL THERAPY NOTE
Occupational Therapy Evaluation     Patient Name: Narcisa Boyd  IFGIC'Q Date: 7/2/2020  Problem List  Active Problems:    Intraparenchymal hematoma of left side of brain due to trauma [facility])    Fall    Traumatic ecchymosis of face    Past Medical History  Past Medical History:   Diagnosis Date    CVA (cerebral vascular accident) ([facility] [place] [address] )     High cholesterol     Hypertension      Past Surgical History  Past Surgical History:   Procedure Laterality Date    APPENDECTOMY           07/02/20 0823   Note Type   Note type Eval/Treat   Restrictions/Precautions   Weight Bearing Precautions Per Order No   Other Precautions Cognitive; Chair Alarm; Bed Alarm;Multiple lines; Fall Risk   Pain Assessment   Pain Assessment Tool Pain Assessment not indicated - pt denies pain   Pain Score No Pain   Home Living   Type of Home Apartment   Home Layout One level;Stairs to enter with rails  (3 ANDREW )   Bathroom Shower/Tub Tub/shower unit   Bathroom Toilet Standard   Bathroom Equipment Shower chair   [address] Walker   Prior Function   Level of [place] Independent with ADLs and functional mobility   Lives With Alone   Receives Help From Family;Friend(s)   ADL Assistance Independent   IADLs Needs assistance   Falls in the last 6 months 1 to 4   Lifestyle   Autonomy PT IS A POOR HISTORIAN  PER CM, FROM HOME ALONE AND INDEPENDENT WITH ADLS/LT IADLS  HAS OCCASIONAL ASSIST WITH BATHING    Reciprocal Relationships LIVES ALONE   S/O VISITS DAILY    Service to Others UNKNOWN   Intrinsic Gratification UNKNOWN    ADL   Eating Assistance 5  Supervision/Setup   Grooming Assistance 5  Supervision/Setup   UB Bathing Assistance 4  Minimal Assistance   LB Bathing Assistance 4  Minimal Assistance   UB Dressing Assistance 4  Minimal Assistance   LB Dressing Assistance 4  [address] 4  Minimal Assistance   Bed Mobility   Supine to Sit 4  Minimal assistance   Additional items Assist x 1; Increased time required;Verbal cues;LE management   Sit to Supine Unable to assess  (PT LEFT OOB WITH ALARM ON )   Transfers   Sit to Stand 4  Minimal assistance   Additional items Assist x 1; Increased time required;Verbal cues   Stand to Sit 4  Minimal assistance   Additional items Assist x 1; Increased time required;Verbal cues   Functional Mobility   Functional Mobility 4  Minimal assistance   Additional items Rolling walker   Balance   Static Sitting Fair +   Static Standing Fair -   Ambulatory Poor +   Activity Tolerance   Activity Tolerance Patient limited by fatigue; Other (Comment)  (COG)   Medical Staff Made Aware SPOKE TO CM REGARDING D/C PLAN    Nurse Made Aware APPROPRIATE TO SEE PER JOSHUA TAN    RUE Assessment   RUE Assessment WFL   LUE Assessment   LUE Assessment WFL   Hand Function   Gross Motor Coordination Functional   Fine Motor Coordination Functional   Cognition   Overall Cognitive Status Impaired   Arousal/Participation Alert; Cooperative   Attention Attends with cues to redirect   Orientation Level Oriented to person;Disoriented to place; Disoriented to time;Disoriented to situation   Memory Decreased recall of precautions;Decreased recall of recent events;Decreased short term memory;Decreased recall of biographical information   Following Commands Follows one step commands with increased time or repetition   Comments PT IS PLEASANTLY CONFUSED  ORIENTED TO PERSON, INCLUDING NAME ONLY ORIGINALLY  LATER ABLE TO STATE   PT IS EASILY FORGETFUL AND REQUIRED CUES TO REDIRECT  LIMITED INSIGHT/JUDGEMENT/SAFETY AWARENESS  ALARM ON FOR SAFETY  RECOMMEND LIGHTS ON/BLINDS OPEN DURING DAY TIME HOURS TO PROMOTE PROPER SLEEP-WAKE CYCLE  Assessment   Limitation Decreased ADL status; Decreased Safe judgement during ADL;Decreased cognition;Decreased endurance;Decreased self-care trans;Decreased high-level ADLs   Prognosis Guarded   Assessment 79 YO Female SEEN FOR INITIAL OCCUPATIONAL THERAPY EVALUATION FOLLOWING TXF FROM [facility] S/P FALL RESULTING IN INTRAPARENCHYMAL HEMATOMA AND FACIAL ECCHYMOSIS  PROBLEMS LIST INCLUDES HTN, HLD, CVA AND COGNITIVE IMPAIRMENT  PT IS A POOR HISTORIAN, INFORMATION OBTAINED FROM PT'S CHART  PT IS FROM HOME ALONE WHERE S/O VISITS DAILY  PT IS INDEPENDENT WITH ADLS/LT IADLS  PT CURRENTLY REQUIRES OVERALL MIN A WITH ADLS, TRANSFERS AND FUNCTIONAL MOBILITY WITH USE OF RW  PT IS LIMITED 2' PAIN, FATIGUE, IMPAIRED BALANCE, FALL RISK , LIMITED SAFETY AWARENESS/INSIGHT/JUDGEMENT, DISORIENTATION, OVERALL WEAKNESS/DECONDITIONING , LIMITED FAMILY/FRIEND SUPPORT , INACCESSIBLE HOME ENVIRONMENT and OVERALL LIMITED ACTIVITY TOLERANCE  PT EDUCATED ON DEEP BREATHING TECHNIQUES T/O ACTIVITY, SLOWING OF PACE and CONTINUE PARTICIPATION IN SELF-CARE/MOBILITY WITH STAFF WHILE IN THE HOSPITAL   FROM AN OCCUPATIONAL THERAPY PERSPECTIVE, PT WOULD BENEFIT FROM ADDITIONAL OT SERVICES IN AN INPT REHAB SETTING UPON D/C- PT WOULD BENEFIT FROM FORMAL COGNITIVE ASSESSMENT PRIOR TO RETURNING HOME ALONE  WILL CONT TO FOLLOW TO ADDRESS THE BELOW DESCRIBED GOALS  Goals   Patient Goals TO SIT    LTG Time Frame 10-14   Long Term Goal #1 SEE BELOW    Plan   Treatment Interventions ADL retraining;Functional transfer training; Endurance training;Cognitive reorientation;Patient/family training;Equipment evaluation/education; Compensatory technique education; Energy conservation; Activityengagement   Goal Expiration Date 07/16/20   OT Frequency 3-5x/wk   Recommendation   OT Discharge Recommendation Post-Acute Rehabilitation Services  (VS PROBABLE 24 HOUR SUPERVISION PENDING FORMAL COG ASSESSMEN)   OT - OK to Discharge Yes   Modified Dukes Scale   Modified Dukes Scale 4       OCCUPATIONAL THERAPY GOALS TO BE MET WITHIN 10-14DAYS:  -Pt will complete additional cognitive assessment (ACLS)with 100% attention to task in order to assist with safe d/c plan     -Pt will increase bed mobility to MOD I to participate in functional activities with G tolerance and balance  -Pt will improve functional mobility and transfers to MOD I on/off all surfaces w/ G balance/safety including toileting   -Pt will participate in lt grooming task with MOD I after set-up standing at sink ~3-5 minutes with G safety and balance  -Pt will increase independence in all ADLS to MOD I with G balance sitting upright in chair   -Pt will improve activity tolerance to G for 30 min txment sessions w/ G carry over of learned energy conservation techniques   -Pt will improve independence in lt homemaking activities to MOD I without requiring cues for safety   -Pt will demonstrate G carryover of learned safety techniques and proper body mechanics in functional and leisure activities with use of DME      Documentation completed by Austin Shah, MOT, OTR/L

## 2020-07-02 NOTE — PHYSICAL THERAPY NOTE
PHYSICAL THERAPY EVALUATION          Patient Name: Fabienne Matute  YAVUNJOLYNN Date: 7/2/2020 07/02/20 0620   Note Type   Note type Eval only   Pain Assessment   Pain Assessment Tool 0-10   Pain Score No Pain   Home Living   Type of Home Apartment   Home Layout Stairs to enter with rails; One level  (3 ANDREW)   Bathroom Shower/Tub Tub/shower unit   Bathroom Toilet Standard   Bathroom Equipment Shower chair   Bathroom Accessibility Accessible   Home Equipment Walker;Cane;Wheelchair-manual   Additional Comments Pt dilan historian at this time  Information obtained from CM note  Prior Function   Level of [place] Independent with ADLs and functional mobility   Lives With Alone  (Pt lives with employer )   [place] An Help From Family;Friend(s)  (Pt has dgt and grandson that visit and assist pt prn  )   ADL Assistance Independent   IADLs Independent   Falls in the last 6 months 1 to 4  (1)   Vocational Works at home   Comments Pt dilan historian at this time  Will follow up with CM for prior function  Restrictions/Precautions   Weight Bearing Precautions Per Order No   Other Precautions Cognitive; Chair Alarm; Bed Alarm; Fall Risk;Multiple lines   General   Family/Caregiver Present No   Cognition   Overall Cognitive Status Impaired   Arousal/Participation Alert   Orientation Level Oriented to person;Disoriented to place; Disoriented to time;Disoriented to situation   Memory Decreased recall of recent events;Decreased short term memory;Decreased recall of biographical information;Decreased long term memory   Following Commands Follows one step commands with increased time or repetition   RLE Assessment   RLE Assessment WFL  (ROM WFL)   Strength RLE   RLE Overall Strength 3+/5   LLE Assessment   LLE Assessment WFL  (ROM WFL)   Strength LLE   LLE Overall Strength 3+/5   Bed Mobility   Supine to Sit 4  Minimal assistance   Additional items Assist x 1; Increased time required;Verbal cues   Transfers   Sit to Stand 4  Minimal assistance   Additional items Assist x 1; Increased time required;Verbal cues   Stand to Sit 4  Minimal assistance   Additional items Increased time required;Verbal cues   Ambulation/Elevation   Gait pattern Decreased foot clearance; Foward flexed; Excessively slow   Gait Assistance 4  Minimal assist   Additional items Assist x 1;Verbal cues; Tactile cues   Assistive Device Rolling walker   Distance 60 ft x 2   Stair Management Assistance Not tested   Balance   Static Sitting Fair   Static Standing Poor +   Ambulatory Poor +   Endurance Deficit   Endurance Deficit Yes   Endurance Deficit Description Pt required standing rest breaks   Activity Tolerance   Activity Tolerance Patient limited by fatigue   Medical Staff Made Aware Pt ok to mobilize per nsg   Nurse Made Aware Sonya Daniel RN   Assessment   Prognosis Good   Problem List Decreased strength;Decreased endurance; Impaired balance;Decreased mobility; Impaired judgement;Decreased safety awareness;Decreased cognition   Assessment Pt is an 80 y o  female presenting to [place] [address]  as a trauma transfer from [address] [place] [address] s/p fall  Pt does not recall events of fall  Pt was admitted with a primary diagnosis of Intraparenchymal hematoma of left side of brain due to trauma and active problems including facial ecchymosis  Pt's PMH affecting eval includes high cholesterol, HTN, CVA and personal factors including impaired cognition causing pt to be poor historian and steps to negotiate at home per pt report  Pt seen for high complexity PT eval due to ongoing medical management of admitting diagnosis, impaired cognition, multiple lines, decreased functional ability compared to baseline and fall risk  Upon eval, pt was awake resting in bed  Pt required Min Ax1 with bed mobility, transfers and ambulation  Pt ambulated 60 ft x 2 with rolling walker and VCs to keep walker closer for safety  Based on evaluation, pt's current list of problems includes decreased b/l LE strength, decreased endurance, impaired balance, decreased mobility, decreased cognition, judgment and safety awareness  PT will continue to follow pt for remainder of hospital stay to address these impairments  At conclusion of evaluation, pt was left up in chair with all needs within reach and chair alarm on  PT currently recommending rehab at D/C  PT will follow up with case management regarding social history and prior level of function due to pt being poor historian at this time  Barriers to Discharge Decreased caregiver support; Inaccessible home environment   Goals   Patient Goals To go home   STG Expiration Date 07/12/20   Short Term Goal #1 In 10 days, pt will  Roxana Mckeon 1) perform all aspects of bed mobility with supervision in order to decrease caregiver burden  2) perform transfers with supervision in order to increase functional independence  3) ambulate 250 ft with supervision and least restrictive AD in order to access community environment  4) ascend/descend 3 steps with supervision in order to safely enter home  5) improve b/l LE strength by 1/2 grade in order to perform transfers with greater ease  6) improve ambulatory balance by 1 grade in order to reduce risk of falls  Plan   Treatment/Interventions Functional transfer training;LE strengthening/ROM; Elevations; Therapeutic exercise; Endurance training;Patient/family training;Equipment eval/education; Bed mobility;Gait training;Spoke to nursing;OT   PT Frequency Other (Comment)  (3-6x/wk)   Recommendation   PT Discharge Recommendation Post-Acute Rehabilitation Services   Equipment Recommended Walker   PT - OK to Discharge Yes  (To rehab when medically cleared )   Modified Clayton Scale   Modified Carl Scale 4   Barthel Index   Feeding 10   Bathing 0   Grooming Score 0   Dressing Score 5   Bladder Score 0   Bowels Score 10   Toilet Use Score 5   Transfers (Bed/Chair) Score 10 Mobility (Level Surface) Score 10   Stairs Score 0   Barthel Index Score 50     Elodiay Leann, SPT

## 2020-07-02 NOTE — QUICK NOTE
Patients son Garold Riedel came to  patient, reviewed the recommendation to start Aricept as was discussed with his brother him earlier in the day and provided information regarding medication, dosing, side effects and benefits, and caregiver resource packet for dementia and available services in the community  Afiakimberly Ross had called to talk to nurse about discharge instructions and ensure VNA was arranged for her discharge which per nursing was set up by case management prior to discharge  Discussed the medication recommendation of initiation of Aricept that Afia Vandana had requested earlier and answers were answered to his satisfaction  Also discussed cognitive screening test including mini-cog with score 3/5 and recommendation for comprehensive geriatric assessment as o/p  Afia Ross was recommended to call [facility] for appointment for assessment for patient, have also sent message to my scheduling team to reach out to Afia Ross to schedule this appointment if he does not call to do so  Noted that [facility] had two medications that patient is no longer on (Plendil and Lisinopril per medication list confirmed with [facility] pharmacy on admission), primary team notified, and they will update list now and contact family to verify correct medication list provided on d/c

## 2020-07-02 NOTE — PHYSICAL THERAPY NOTE
PHYSICAL THERAPY EVALUTAION          Patient Name: Yani Kebede  TLZEIVERÓNICA Date: 7/2/2020 07/02/20 0321   Note Type   Note type Eval only   Pain Assessment   Pain Assessment Tool 0-10   Pain Score No Pain   Home Living   Type of Home House   Home Layout Stairs to enter with rails; Two level  (3 ANDREW)   Bathroom Shower/Tub Tub/shower unit   Bathroom Toilet Standard   Home Equipment Walker;Cane;Wheelchair-manual   Additional Comments Pt dilan historian at this time  Per case management, pt lives in 1st floor apartment with 3 ANDREW   Prior Function   Level of [place] Independent with ADLs and functional mobility   Lives With Friend(s)  (Pt lives with employer )   Delfina Jack Help From Family;Friend(s)  (Pt has dgt and grandson that visit and assist pt prn  )   ADL Assistance Independent   IADLs Independent   Falls in the last 6 months 1 to 4  (1)   Vocational Works at home   Comments Pt dilan historian at this time  Will follow up with CM for prior function  Restrictions/Precautions   Weight Bearing Precautions Per Order No   Other Precautions Cognitive; Chair Alarm; Bed Alarm; Fall Risk;Multiple lines   General   Family/Caregiver Present No   Cognition   Overall Cognitive Status Impaired   Arousal/Participation Alert   Orientation Level Oriented to person;Disoriented to place; Disoriented to time;Disoriented to situation   Memory Decreased recall of recent events;Decreased short term memory;Decreased recall of biographical information;Decreased long term memory   Following Commands Follows one step commands with increased time or repetition   RLE Assessment   RLE Assessment WFL  (ROM WFL)   Strength RLE   RLE Overall Strength 3+/5   LLE Assessment   LLE Assessment WFL  (ROM WFL)   Strength LLE   LLE Overall Strength 3+/5   Bed Mobility   Supine to Sit 4  Minimal assistance   Additional items Assist x 1; Increased time required;Verbal cues   Transfers   Sit to Stand 4  Minimal assistance   Additional items Assist x 1; Increased time required;Verbal cues   Stand to Sit 4  Minimal assistance   Additional items Increased time required;Verbal cues   Ambulation/Elevation   Gait pattern Decreased foot clearance; Foward flexed; Excessively slow   Gait Assistance 4  Minimal assist   Additional items Assist x 1;Verbal cues; Tactile cues   Assistive Device Rolling walker   Distance 60 ft x 2   Stair Management Assistance Not tested   Balance   Static Sitting Fair   Static Standing Poor +   Ambulatory Poor +   Endurance Deficit   Endurance Deficit Yes   Endurance Deficit Description Pt required standing rest breaks   Activity Tolerance   Activity Tolerance Patient limited by fatigue   Medical Staff Made Aware Pt ok to mobilize per nsg   Nurse Made Aware Briana Estrada RN   Assessment   Prognosis Good   Problem List Decreased strength;Decreased endurance; Impaired balance;Decreased mobility; Impaired judgement;Decreased safety awareness;Decreased cognition   Assessment Pt is an 80 y o  female presenting to [place] [id number]  as a trauma transfer from [employer] s/p fall  Pt does not recall events of fall  Pt was admitted with a primary diagnosis of Intraparenchymal hematoma of left side of brain due to trauma and active problems including facial ecchymosis  Pt's PMH affecting eval includes high cholesterol, HTN, CVA and personal factors including impaired cognition causing pt to be poor historian and steps to negotiate at home per pt report  Pt seen for high complexity PT eval due to ongoing medical management of admitting diagnosis, impaired cognition, multiple lines, decreased functional ability compared to baseline and fall risk  Upon eval, pt was awake resting in bed  Pt required Min Ax1 with bed mobility, transfers and ambulation  Pt ambulated 60 ft x 2 with rolling walker and VCs to keep walker closer for safety   Based on evaluation, pt's current list of problems includes decreased b/l LE strength, decreased endurance, impaired balance, decreased mobility, decreased cognition, judgment and safety awareness  PT will continue to follow pt for remainder of hospital stay to address these impairments  At conclusion of evaluation, pt was left up in chair with all needs within reach and chair alarm on  PT currently recommending rehab at D/C  PT will follow up with case management regarding social history and prior level of function due to pt being poor historian at this time  Barriers to Discharge Decreased caregiver support; Inaccessible home environment   Goals   Patient Goals To go home   STG Expiration Date 07/12/20   Short Term Goal #1 In 10 days, pt will  Kimberly Soulier Dara Soulier 1) perform all aspects of bed mobility with supervision in order to decrease caregiver burden  2) perform transfers with supervision in order to increase functional independence  3) ambulate 250 ft with supervision and least restrictive AD in order to access community environment  4) ascend/descend 3 steps with supervision in order to safely enter home  5) improve b/l LE strength by 1/2 grade in order to perform transfers with greater ease  6) improve ambulatory balance by 1 grade in order to reduce risk of falls  Plan   Treatment/Interventions Functional transfer training;LE strengthening/ROM; Elevations; Therapeutic exercise; Endurance training;Patient/family training;Equipment eval/education; Bed mobility;Gait training;Spoke to nursing;OT   PT Frequency Other (Comment)  (3-6x/wk)   Recommendation   PT Discharge Recommendation Post-Acute Rehabilitation Services   Equipment Recommended Walker   PT - OK to Discharge Yes  (To rehab when medically cleared )   Modified Loco Scale   Modified Loco Scale 4   Barthel Index   Feeding 10   Bathing 0   Grooming Score 0   Dressing Score 5   Bladder Score 0   Bowels Score 10   Toilet Use Score 5   Transfers (Bed/Chair) Score 10   Mobility (Level Surface) Score 10 Stairs Score 0   Barthel Index Score 50     Sean Trevino, SPT

## 2020-07-02 NOTE — SOCIAL WORK
CM met with pt and her son Meseret Tavarez to discuss the role of CM  Pt lives alone but her s/o comes very day at 5pm and stays over night  Pt resides in a 1st floor apartment which has 3STE  Pt's bathroom is a tub/shower with grab bars and regular toilet  Pt is independent for most ADLs but does need assistance with bathing  Pt owns a walker and shower seat  Pt's pharmacy is [facility]  Pt has no hx of mental health, substance abuse, IP rehab, or VNA  Pt is recommended for IP rehab  Pt and son wish to d/c home with VNA  They have no preference  CM placed referrals  CM provided pt's son Meseret Tavarez with a list of non-skilled home health agencies as well as the telephone number for [facility] [facility] )  Cm will follow up VNA agency  CM reviewed d/c planning process including the following: identifying help at home, patient preference for d/c planning needs, Discharge Lounge, [facility] Meds to Bed program, availability of treatment team to discuss questions or concerns patient and/or family may have regarding understanding medications and recognizing signs and symptoms once discharged  CM also encouraged patient to follow up with all recommended appointments after discharge  Patient advised of importance for patient and family to participate in managing patients medical well being

## 2020-07-06 ENCOUNTER — TELEPHONE (OUTPATIENT)
Dept: GERIATRICS | Age: 85
End: 2020-07-06

## 2020-07-06 LAB
BACTERIA BLD CULT: NORMAL
BACTERIA BLD CULT: NORMAL

## 2020-07-06 NOTE — TELEPHONE ENCOUNTER
[address]  [phone]  Telephone Intake      Referral Source: Dr Jenna Mckinnon from hospital stay on 7/1/2020              Patients PCP: Criselda Cotton (and relationship to patient): Josie Koroma  (son)      (relationship to patient): same as caller                       Phone Number: [phone]              Is caller POA? Yes      Reason for referral: pt is experiencing memory changes, often is in the wrong time zone, continuously asking for people who have already passed like her parents and her son,      Others residing with patient: lives with alone, but friend stays with her all day      Has the patient ever been formally tested for memory/dementia? No     Has the patient ever been diagnosed with dementia? No     Has the patient been seen by a Neurologist?  No     What is the goal of visit? 3english     Preferred language? english    Highest education level? Highest Level of Education: Raad     Can patient read English? Yes     Does the patient wear glasses? Yes     Does the patient use hearing aids?  No     First Visit: 8/11/2020    Conference Visit: tbd    Office packet mailed out: Yes     In office visit and family conference video visit process explained: No

## 2020-07-08 ENCOUNTER — TRANSCRIBE ORDERS (OUTPATIENT)
Dept: LAB | Facility: CLINIC | Age: 85
End: 2020-07-08

## 2020-07-08 ENCOUNTER — APPOINTMENT (OUTPATIENT)
Dept: LAB | Facility: CLINIC | Age: 85
End: 2020-07-08
Payer: MEDICARE

## 2020-07-08 DIAGNOSIS — Z79.899 ENCOUNTER FOR LONG-TERM (CURRENT) USE OF OTHER MEDICATIONS: ICD-10-CM

## 2020-07-08 DIAGNOSIS — Z79.899 ENCOUNTER FOR LONG-TERM (CURRENT) USE OF OTHER MEDICATIONS: Primary | ICD-10-CM

## 2020-07-08 DIAGNOSIS — I10 ESSENTIAL HYPERTENSION, MALIGNANT: ICD-10-CM

## 2020-07-08 DIAGNOSIS — E11.9 DIABETES MELLITUS WITHOUT COMPLICATION (HCC): ICD-10-CM

## 2020-07-08 DIAGNOSIS — E78.5 HYPERLIPIDEMIA, UNSPECIFIED HYPERLIPIDEMIA TYPE: ICD-10-CM

## 2020-07-08 LAB
ALBUMIN SERPL BCP-MCNC: 3.6 G/DL (ref 3.5–5)
ALP SERPL-CCNC: 79 U/L (ref 46–116)
ALT SERPL W P-5'-P-CCNC: 20 U/L (ref 12–78)
ANION GAP SERPL CALCULATED.3IONS-SCNC: 3 MMOL/L (ref 4–13)
AST SERPL W P-5'-P-CCNC: 15 U/L (ref 5–45)
BILIRUB SERPL-MCNC: 0.62 MG/DL (ref 0.2–1)
BUN SERPL-MCNC: 17 MG/DL (ref 5–25)
CALCIUM SERPL-MCNC: 9.3 MG/DL (ref 8.3–10.1)
CHLORIDE SERPL-SCNC: 107 MMOL/L (ref 100–108)
CHOLEST SERPL-MCNC: 162 MG/DL (ref 50–200)
CK SERPL-CCNC: 74 U/L (ref 26–192)
CO2 SERPL-SCNC: 29 MMOL/L (ref 21–32)
CREAT SERPL-MCNC: 0.73 MG/DL (ref 0.6–1.3)
EST. AVERAGE GLUCOSE BLD GHB EST-MCNC: 163 MG/DL
FOLATE SERPL-MCNC: 17.6 NG/ML (ref 3.1–17.5)
GFR SERPL CREATININE-BSD FRML MDRD: 74 ML/MIN/1.73SQ M
GLUCOSE P FAST SERPL-MCNC: 132 MG/DL (ref 65–99)
HBA1C MFR BLD: 7.3 %
HDLC SERPL-MCNC: 48 MG/DL
LDLC SERPL CALC-MCNC: 102 MG/DL (ref 0–100)
NONHDLC SERPL-MCNC: 114 MG/DL
POTASSIUM SERPL-SCNC: 4.4 MMOL/L (ref 3.5–5.3)
PROT SERPL-MCNC: 7.4 G/DL (ref 6.4–8.2)
SODIUM SERPL-SCNC: 139 MMOL/L (ref 136–145)
TRIGL SERPL-MCNC: 61 MG/DL
TSH SERPL DL<=0.05 MIU/L-ACNC: 1.89 UIU/ML (ref 0.36–3.74)
VIT B12 SERPL-MCNC: 401 PG/ML (ref 100–900)

## 2020-07-08 PROCEDURE — 84443 ASSAY THYROID STIM HORMONE: CPT

## 2020-07-08 PROCEDURE — 83036 HEMOGLOBIN GLYCOSYLATED A1C: CPT

## 2020-07-08 PROCEDURE — 82746 ASSAY OF FOLIC ACID SERUM: CPT

## 2020-07-08 PROCEDURE — 80053 COMPREHEN METABOLIC PANEL: CPT

## 2020-07-08 PROCEDURE — 82550 ASSAY OF CK (CPK): CPT

## 2020-07-08 PROCEDURE — 36415 COLL VENOUS BLD VENIPUNCTURE: CPT

## 2020-07-08 PROCEDURE — 80061 LIPID PANEL: CPT

## 2020-07-08 PROCEDURE — 82607 VITAMIN B-12: CPT

## 2020-07-15 ENCOUNTER — NURSE TRIAGE (OUTPATIENT)
Dept: OTHER | Facility: OTHER | Age: 85
End: 2020-07-15

## 2020-07-15 NOTE — TELEPHONE ENCOUNTER
Reason for Disposition   Nursing judgment    Answer Assessment - Initial Assessment Questions  1  REASON FOR CALL or QUESTION: "What is your reason for calling today?" or "How can I best help you?" or "What question do you have that I can help answer?"      Should I take my mother for her CT scan? I may have been exposed to COVID-19 through my son and we are waiting for his test results  I have been around her with a mask on      Protocols used: INFORMATION ONLY CALL - NO TRIAGE-ADULT-OH

## 2020-07-15 NOTE — TELEPHONE ENCOUNTER
Marry Cisneros stated that he called to find out what the protocol is for his mother's CT scan  Marry Cisneros was exposed to his son who potentially has COVID-19 and is being tested  Marry Cisneros has been around patient and doesn't know if both of them should be tested before he takes her for the CT sacn  He was advised to call [facility] or her PCP to find out what is recommended

## 2020-07-15 NOTE — TELEPHONE ENCOUNTER
Regarding: covid  ----- Message from [facility] sent at 7/15/2020  3:25 PM EDT -----  "My mother was exposed to possible covid patient "

## 2020-07-16 ENCOUNTER — HOSPITAL ENCOUNTER (OUTPATIENT)
Dept: RADIOLOGY | Facility: MEDICAL CENTER | Age: 85
Discharge: HOME | End: 2020-07-16
Payer: MEDICARE

## 2020-07-16 DIAGNOSIS — S06.350A: ICD-10-CM

## 2020-07-16 PROCEDURE — 70450 CT HEAD/BRAIN W/O DYE: CPT

## 2020-07-17 ENCOUNTER — OFFICE VISIT (OUTPATIENT)
Dept: NEUROSURGERY | Facility: CLINIC | Age: 85
End: 2020-07-17
Payer: MEDICARE

## 2020-07-17 VITALS
HEIGHT: 61 IN | WEIGHT: 125 LBS | RESPIRATION RATE: 16 BRPM | BODY MASS INDEX: 23.6 KG/M2 | SYSTOLIC BLOOD PRESSURE: 146 MMHG | TEMPERATURE: 97.8 F | DIASTOLIC BLOOD PRESSURE: 88 MMHG | HEART RATE: 59 BPM

## 2020-07-17 DIAGNOSIS — S06.350D INTRAPARENCHYMAL HEMATOMA OF BRAIN, LEFT, WITHOUT LOSS OF CONSCIOUSNESS, SUBSEQUENT ENCOUNTER: ICD-10-CM

## 2020-07-17 DIAGNOSIS — D18.00 CAVERNOUS ANGIOMA: Primary | ICD-10-CM

## 2020-07-17 PROCEDURE — 99214 OFFICE O/P EST MOD 30 MIN: CPT | Performed by: PHYSICIAN ASSISTANT

## 2020-07-17 NOTE — PROGRESS NOTES
Office Note - Neurosurgery   Mikie Elaine 80 y o  female MRN: [id number]      Assessment:  Patient is 80years old pleasant lady with history of underlying dementia, stroke 8 years, hypertension here today for 2 weeks follow-up with CT head demonstrating suspected 5 millimeter left temporoparietal white matter hyperdense lesion, intraparenchymal hematoma to rule out cavernous angioma  Size  decreased slightly  Chronic left thalamus and right cerebellum the chronic lacunar infarction  Patient history of fall on Plavix and aspirin about 2 weeks ago  Baseline confusion  Denies any headache, blurry vision, diplopia, nausea, vomiting, swallowing issues or speech problems  Denies any numbness, for weakness  Reports occasional dizziness and lightheadedness when she gets up  Her blood pressure medication adjusted by her PCP, currently on metoprolol  No bowel/bladder dysfunction  Denies seizure  Patient is off Plavix and aspirin currently  Doing physical therapy  Neuro exam- Patient alert but confused and disorientedx3, EOMI 2 mm conjugate bilaterally, SF, moves all extremities, finger-to-nose normal and without drift bilaterally  Strength is 5/5 and sensation intact throughout  DTR 2+ without clonus  Gait instability noted on tandem walking  Hx, PE, and images reviewed  Management plan and prognosis discussed with the patient and her sister  MRI of brain without contrast ordered to characterize cavernous angioma vs intraparenchymal hematoma of left temporoparietal junction  Given her risk of frequent falls while on Plavix and aspirin,  advised patient to discuss with her PCP/neurologist who put her on double anticoagulant medication to discontinue either of ACs-  Risks and benefits  Fall precaution  Advised to continue monitoring blood pressure and follow-up with her PCP  Questions and concerns were answered  Both patient and her sister expressed their understanding is and agreed with the plan  Follow-up in 1 month with brain MRI results  Plan:  1  MRI of brain without contrast in 1 month  2  Continue monitoring blood pressure  3  Follow-up with PCP/neurologist to discuss the risks and benefits of putting patient on double ACs in the setting of gait instability and tendency to fall  4  Fall precaution  5  Note to her PCP  6  Call office if there is concern or question      Subjective/Objective     Chief Complaint: " I am doing fine"/2 weeks traumatic IPH        HPI  Patient is 80years old pleasant lady with history of stroke, dementia, altered mental status, gait instability here today for 2 weeks follow-up of left temporoparietal junction traumatic IPH  with possible cavernous angioma  Patient is off of ASA and Plavix currently  She is confused and disoriented at baseline  She reports occasional dizziness and lightheadedness when she gets up quickly from sitting position  Denies headache, seizure,  nausea, vomiting, blurry vision or diplopia  Denies swallowing issues or speech problem  Denies any numbness, weakness or paresthesia in the extremities  Appetite is good denies any nausea, vomiting, or bladder dysfunction  Denies history of fever, chills, rigors, cough or chest pain  Patient is doing physical therapy  Denies taking any pain medication  ROS  Review of system personally reviewed and updated  Review of Systems   Constitutional: Negative  HENT: Negative  Eyes: Negative  Respiratory: Negative  Cardiovascular: Negative  Gastrointestinal: Negative  Endocrine: Negative  Genitourinary: Negative  Nocturia   Musculoskeletal: Positive for gait problem (when moving from sitting to standing position pt exeriences a brief period of dizziness  Slight unsteadiness in gait )  Allergic/Immunologic: Negative  Neurological: Negative for dizziness, weakness, numbness and headaches  Hematological: Negative  Psychiatric/Behavioral: Positive for confusion  Family History    Family History   Problem Relation Age of Onset    Dementia Father     Dementia Other        Social History    Social History     Socioeconomic History    Marital status:       Spouse name: Not on file    Number of children: Not on file    Years of education: Not on file    Highest education level: Not on file   Occupational History    Not on file   Social Needs    Financial resource strain: Not on file    Food insecurity:     Worry: Not on file     Inability: Not on file    Transportation needs:     Medical: Not on file     Non-medical: Not on file   Tobacco Use    Smoking status: Never Smoker    Smokeless tobacco: Never Used   Substance and Sexual Activity    Alcohol use: Never     Frequency: Never     Drinks per session: Patient refused     Binge frequency: Never    Drug use: Never    Sexual activity: Not on file   Lifestyle    Physical activity:     Days per week: Not on file     Minutes per session: Not on file    Stress: Not on file   Relationships    Social connections:     Talks on phone: Not on file     Gets together: Not on file     Attends Hoahaoism service: Not on file     Active member of club or organization: Not on file     Attends meetings of clubs or organizations: Not on file     Relationship status: Not on file    Intimate partner violence:     Fear of current or ex partner: Not on file     Emotionally abused: Not on file     Physically abused: Not on file     Forced sexual activity: Not on file   Other Topics Concern    Not on file   Social History Narrative    Not on file       Past Medical History    Past Medical History:   Diagnosis Date    [facility]A [facility]cere[facility] accident) ([facility] [address] )     High cholesterol     Hypertension        Surgical History    Past Surgical History:   Procedure Laterality Date    APPENDECTOMY         Medications      Current Outpatient Medications:     acetaminophen (TYLENOL) 325 mg tablet, Take 3 tablets (975 mg total) by mouth every 8 (eight) hours, Disp: 30 tablet, Rfl: 0    amLODIPine (NORVASC) 5 mg tablet, Take 2 tablets (10 mg total) by mouth daily, Disp: , Rfl: 0    atorvastatin (LIPITOR) 40 mg tablet, Take by mouth , Disp: , Rfl:     Calcium Carb-Cholecalciferol (CALTRATE 600+D) 600-800 MG-UNIT TABS, Take by mouth, Disp: , Rfl:     donepezil (ARICEPT) 5 mg tablet, Take 1 tablet (5 mg total) by mouth daily at bedtime, Disp: 30 tablet, Rfl: 0    escitalopram (LEXAPRO) 10 mg tablet, Take 10 mg by mouth daily, Disp: , Rfl: 0    Magnesium Oxide 400 MG CAPS, Take by mouth, Disp: , Rfl:     metFORMIN (GLUCOPHAGE) 500 mg tablet, Take 500 mg by mouth daily with dinner, Disp: , Rfl:     metoprolol succinate (TOPROL-XL) 25 mg 24 hr tablet, Take by mouth, Disp: , Rfl:     Allergies    No Active Allergies    The following portions of the patient's history were reviewed and updated as appropriate: allergies, current medications, past family history, past medical history, past social history, past surgical history and problem list     Investigations    I personally reviewed the CT head results with the patient:    Findings as described in the assessment section above    Physical Exam    There were no vitals filed for this visit  Physical Exam   Constitutional: She appears well-developed and well-nourished  HENT:   Head: Normocephalic and atraumatic  Eyes: EOM are normal    Neck: Normal range of motion  Cardiovascular: Normal rate  Pulmonary/Chest: Effort normal    Musculoskeletal: Normal range of motion  Neurological: She is alert  She has normal strength  No cranial nerve deficit or sensory deficit  She has a normal Finger-Nose-Finger Test  GCS eye subscore is 4  GCS verbal subscore is 4  GCS motor subscore is 6  Reflex Scores:       Tricep reflexes are 2+ on the right side and 3+ on the left side  Bicep reflexes are 2+ on the right side and 3+ on the left side         Brachioradialis reflexes are 2+ on the right side and 3+ on the left side  Patellar reflexes are 3+ on the right side  Achilles reflexes are 3+ on the right side and 3+ on the left side  Patient alert and communicates well but confused and disoriented x3   Skin: Skin is warm  Psychiatric: Her speech is normal      Neurologic Exam     Mental Status   Attention: decreased  Concentration: decreased  Speech: speech is normal   Level of consciousness: alert  Knowledge: poor  Unable to perform simple calculations  Cranial Nerves     CN III, IV, VI   Extraocular motions are normal    Nystagmus: none     CN XI   CN XI normal      Motor Exam   Muscle bulk: normal  Overall muscle tone: normal  Right arm tone: normal  Left arm tone: normal  Right arm pronator drift: absent  Left arm pronator drift: absent  Right leg tone: normal  Left leg tone: normal    Strength   Strength 5/5 throughout       Sensory Exam   Light touch normal      Gait, Coordination, and Reflexes     Coordination   Finger to nose coordination: normal    Reflexes   Right brachioradialis: 2+  Left brachioradialis: 3+  Right biceps: 2+  Left biceps: 3+  Right triceps: 2+  Left triceps: 3+  Right patellar: 3+  Right achilles: 3+  Left achilles: 3+  Right : 2+  Left : 2+  Right plantar: normal  Left plantar: normal  Right Chandra: absent  Left Chandra: absent  Right ankle clonus: absent  Left ankle clonus: absent

## 2020-08-19 NOTE — PHYSICIAN ADVISOR
Current patient class: Inpatient  The patient is currently on Hospital Day: 2 at [facility]      The patient was admitted to the hospital at [phone] on 7/1/20 for the following diagnosis:  Multiple injuries [T07  XXXA]  Intraparenchymal hematoma of left side of brain due to trauma, without loss of consciousness, initial encounter ([facility] [address] ) [S06 350A]       There was documentation in the medical record of an expected length of stay of at least 2 midnights  The patient was therefore expected to satisfy the 2 midnight benchmark and given the 2 midnight presumption is appropriate for INPATIENT ADMISSION  Given this expectation of a satisfying stay, CMS instructs us that the patient is most often appropriate for inpatient admission under part A provided medical necessity is documented in the chart  After review of the relevant documentation, labs, vital signs and test results, the patient is appropriate for INPATIENT ADMISSION  Admission to the hospital as an inpatient is a complex decision making process which requires the practitioner to consider the patients presenting complaint, history and physical examination and all relevant testing  With this in mind, in this case, the patient was deemed appropriate for INPATIENT ADMISSION  After review of the documentation and testing available at the time of the admission I concur with this clinical determination of medical necessity  Rationale is as follows: This patient with advanced age sustained a small occipital hemorrhage in the setting of chronic aspirin and Plavix use  She received DDAVP  She had increased ambulatory difficulties and it was not clear that she would be stable for discharge after an overnight observation  The patient was placed under inpatient class anticipating at least a two midnight stay    Inpatient class was appropriate given her presentation, increased likelihood of adverse outcomes, and the unexpected change in management that occurred  Ultimately she was stable for discharge and did not require facility transfer  This determination of length of stay was made by the trauma surgeon  The patients vitals on arrival were   ED Triage Vitals   Temperature Pulse Respirations Blood Pressure SpO2   07/01/20 1450 07/01/20 1450 07/01/20 1450 07/01/20 1450 07/01/20 1450   98 1 °F (36 7 °C) 64 20 164/75 95 %      Temp Source Heart Rate Source Patient Position - Orthostatic VS BP Location FiO2 (%)   07/01/20 1450 07/01/20 1450 07/01/20 1450 07/01/20 1450 --   Oral Monitor Lying Right arm       Pain Score       07/01/20 1500       No Pain           Past Medical History:   Diagnosis Date    [facility]cerebral [facility] accident) ([facility] [address] )     High cholesterol     Hypertension      Past Surgical History:   Procedure Laterality Date    APPENDECTOMY             Consults have been placed to:   IP CONSULT TO NEUROSURGERY  IP CONSULT TO GERONTOLOGY    Vitals:    07/02/20 0320 07/02/20 0700 07/02/20 1154 07/02/20 1200   BP: 155/66 167/83 166/97    BP Location:       Pulse: 63 57 61    Resp: 16 16 16    Temp: 98 5 °F (36 9 °C) 98 1 °F (36 7 °C)     TempSrc:       SpO2: 97% 95% 97% 95%   Weight:       Height:           Most recent labs:    No results for input(s): WBC, HGB, HCT, PLT, K, NA, CALCIUM, BUN, CREATININE, LIPASE, AMYLASE, INR, TROPONINI, CKTOTAL, AST, ALT, ALKPHOS, BILITOT in the last 72 hours  Scheduled Meds:  Continuous Infusions:No current facility-administered medications for this encounter  PRN Meds:      Surgical procedures (if appropriate):

## 2020-11-21 ENCOUNTER — TRANSCRIBE ORDERS (OUTPATIENT)
Dept: LAB | Facility: CLINIC | Age: 85
End: 2020-11-21

## 2020-11-21 ENCOUNTER — LAB (OUTPATIENT)
Dept: LAB | Facility: CLINIC | Age: 85
End: 2020-11-21
Payer: MEDICARE

## 2020-11-21 DIAGNOSIS — Z79.899 ENCOUNTER FOR LONG-TERM (CURRENT) USE OF OTHER MEDICATIONS: ICD-10-CM

## 2020-11-21 DIAGNOSIS — E78.5 HYPERLIPIDEMIA, UNSPECIFIED HYPERLIPIDEMIA TYPE: ICD-10-CM

## 2020-11-21 DIAGNOSIS — I10 ESSENTIAL HYPERTENSION, MALIGNANT: ICD-10-CM

## 2020-11-21 DIAGNOSIS — E11.649 UNCONTROLLED TYPE 2 DIABETES MELLITUS WITH HYPOGLYCEMIA, UNSPECIFIED HYPOGLYCEMIA COMA STATUS (HCC): Primary | ICD-10-CM

## 2020-11-21 DIAGNOSIS — E11.649 UNCONTROLLED TYPE 2 DIABETES MELLITUS WITH HYPOGLYCEMIA, UNSPECIFIED HYPOGLYCEMIA COMA STATUS (HCC): ICD-10-CM

## 2020-11-21 LAB
ALBUMIN SERPL BCP-MCNC: 3.7 G/DL (ref 3.5–5)
ALP SERPL-CCNC: 71 U/L (ref 46–116)
ALT SERPL W P-5'-P-CCNC: 26 U/L (ref 12–78)
ANION GAP SERPL CALCULATED.3IONS-SCNC: 9 MMOL/L (ref 4–13)
AST SERPL W P-5'-P-CCNC: 13 U/L (ref 5–45)
BASOPHILS # BLD AUTO: 0.07 THOUSANDS/ΜL (ref 0–0.1)
BASOPHILS NFR BLD AUTO: 1 % (ref 0–1)
BILIRUB SERPL-MCNC: 0.56 MG/DL (ref 0.2–1)
BUN SERPL-MCNC: 15 MG/DL (ref 5–25)
CALCIUM SERPL-MCNC: 9.4 MG/DL (ref 8.3–10.1)
CHLORIDE SERPL-SCNC: 106 MMOL/L (ref 100–108)
CO2 SERPL-SCNC: 27 MMOL/L (ref 21–32)
CREAT SERPL-MCNC: 0.71 MG/DL (ref 0.6–1.3)
EOSINOPHIL # BLD AUTO: 0.14 THOUSAND/ΜL (ref 0–0.61)
EOSINOPHIL NFR BLD AUTO: 2 % (ref 0–6)
ERYTHROCYTE [DISTWIDTH] IN BLOOD BY AUTOMATED COUNT: 13.2 % (ref 11.6–15.1)
EST. AVERAGE GLUCOSE BLD GHB EST-MCNC: 157 MG/DL
GFR SERPL CREATININE-BSD FRML MDRD: 76 ML/MIN/1.73SQ M
GLUCOSE P FAST SERPL-MCNC: 147 MG/DL (ref 65–99)
HBA1C MFR BLD: 7.1 %
HCT VFR BLD AUTO: 39.5 % (ref 34.8–46.1)
HGB BLD-MCNC: 12.6 G/DL (ref 11.5–15.4)
IMM GRANULOCYTES # BLD AUTO: 0.02 THOUSAND/UL (ref 0–0.2)
IMM GRANULOCYTES NFR BLD AUTO: 0 % (ref 0–2)
LYMPHOCYTES # BLD AUTO: 2.26 THOUSANDS/ΜL (ref 0.6–4.47)
LYMPHOCYTES NFR BLD AUTO: 34 % (ref 14–44)
MCH RBC QN AUTO: 30.1 PG (ref 26.8–34.3)
MCHC RBC AUTO-ENTMCNC: 31.9 G/DL (ref 31.4–37.4)
MCV RBC AUTO: 95 FL (ref 82–98)
MONOCYTES # BLD AUTO: 0.6 THOUSAND/ΜL (ref 0.17–1.22)
MONOCYTES NFR BLD AUTO: 9 % (ref 4–12)
NEUTROPHILS # BLD AUTO: 3.61 THOUSANDS/ΜL (ref 1.85–7.62)
NEUTS SEG NFR BLD AUTO: 54 % (ref 43–75)
NRBC BLD AUTO-RTO: 0 /100 WBCS
PLATELET # BLD AUTO: 221 THOUSANDS/UL (ref 149–390)
PMV BLD AUTO: 9.3 FL (ref 8.9–12.7)
POTASSIUM SERPL-SCNC: 3.8 MMOL/L (ref 3.5–5.3)
PROT SERPL-MCNC: 7.5 G/DL (ref 6.4–8.2)
RBC # BLD AUTO: 4.18 MILLION/UL (ref 3.81–5.12)
SODIUM SERPL-SCNC: 142 MMOL/L (ref 136–145)
WBC # BLD AUTO: 6.7 THOUSAND/UL (ref 4.31–10.16)

## 2020-11-21 PROCEDURE — 85025 COMPLETE CBC W/AUTO DIFF WBC: CPT

## 2020-11-21 PROCEDURE — 83036 HEMOGLOBIN GLYCOSYLATED A1C: CPT

## 2020-11-21 PROCEDURE — 36415 COLL VENOUS BLD VENIPUNCTURE: CPT

## 2020-11-21 PROCEDURE — 80053 COMPREHEN METABOLIC PANEL: CPT

## 2020-11-24 DIAGNOSIS — Z20.822 ENCOUNTER FOR SCREENING LABORATORY TESTING FOR COVID-19 VIRUS: ICD-10-CM

## 2020-11-24 PROCEDURE — U0003 INFECTIOUS AGENT DETECTION BY NUCLEIC ACID (DNA OR RNA); SEVERE ACUTE RESPIRATORY SYNDROME CORONAVIRUS 2 (SARS-COV-2) (CORONAVIRUS DISEASE [COVID-19]), AMPLIFIED PROBE TECHNIQUE, MAKING USE OF HIGH THROUGHPUT TECHNOLOGIES AS DESCRIBED BY CMS-2020-01-R: HCPCS | Performed by: INTERNAL MEDICINE

## 2020-11-25 LAB — SARS-COV-2 RNA SPEC QL NAA+PROBE: NOT DETECTED

## 2020-12-14 ENCOUNTER — HOSPITAL ENCOUNTER (EMERGENCY)
Facility: HOSPITAL | Age: 85
Discharge: HOME | End: 2020-12-14
Attending: EMERGENCY MEDICINE
Payer: MEDICARE

## 2020-12-14 ENCOUNTER — APPOINTMENT (EMERGENCY)
Dept: CT IMAGING | Facility: HOSPITAL | Age: 85
End: 2020-12-14
Payer: MEDICARE

## 2020-12-14 VITALS
HEART RATE: 86 BPM | RESPIRATION RATE: 16 BRPM | HEIGHT: 61 IN | WEIGHT: 125 LBS | DIASTOLIC BLOOD PRESSURE: 73 MMHG | BODY MASS INDEX: 23.6 KG/M2 | OXYGEN SATURATION: 100 % | SYSTOLIC BLOOD PRESSURE: 140 MMHG | TEMPERATURE: 98.1 F

## 2020-12-14 DIAGNOSIS — T14.8XXA CONTUSION: ICD-10-CM
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
## 2020-12-14 LAB — GLUCOSE SERPL-MCNC: 159 MG/DL (ref 65–140)

## 2020-12-14 PROCEDURE — 99284 EMERGENCY DEPT VISIT MOD MDM: CPT

## 2020-12-14 PROCEDURE — 70450 CT HEAD/BRAIN W/O DYE: CPT

## 2020-12-14 PROCEDURE — 82948 REAGENT STRIP/BLOOD GLUCOSE: CPT

## 2020-12-14 PROCEDURE — 99284 EMERGENCY DEPT VISIT MOD MDM: CPT | Performed by: EMERGENCY MEDICINE

## 2020-12-14 PROCEDURE — 70486 CT MAXILLOFACIAL W/O DYE: CPT

## 2020-12-14 PROCEDURE — G1004 CDSM NDSC: HCPCS

## 2020-12-14 PROCEDURE — 72125 CT NECK SPINE W/O DYE: CPT

## 2020-12-14 RX ORDER — DOCUSATE SODIUM 100 MG/1
100 CAPSULE, LIQUID FILLED ORAL DAILY
COMMUNITY

## 2020-12-14 RX ORDER — LISINOPRIL 40 MG/1
40 TABLET ORAL 2 TIMES DAILY
COMMUNITY

## 2020-12-14 RX ORDER — MELATONIN
1000 DAILY
COMMUNITY

## 2020-12-14 RX ORDER — MULTIVITAMIN
1 TABLET ORAL DAILY
COMMUNITY

## 2020-12-14 RX ORDER — ASPIRIN 81 MG/1
81 TABLET, CHEWABLE ORAL DAILY
COMMUNITY

## 2020-12-22 ENCOUNTER — APPOINTMENT (EMERGENCY)
Dept: RADIOLOGY | Facility: HOSPITAL | Age: 85
End: 2020-12-22
Payer: MEDICARE

## 2020-12-22 ENCOUNTER — HOSPITAL ENCOUNTER (EMERGENCY)
Facility: HOSPITAL | Age: 85
Discharge: NURSING, REHAB OR LONG-TERM CARE | End: 2020-12-22
Attending: EMERGENCY MEDICINE | Admitting: EMERGENCY MEDICINE
Payer: MEDICARE

## 2020-12-22 VITALS
SYSTOLIC BLOOD PRESSURE: 160 MMHG | DIASTOLIC BLOOD PRESSURE: 92 MMHG | TEMPERATURE: 98.1 F | OXYGEN SATURATION: 97 % | RESPIRATION RATE: 20 BRPM | HEART RATE: 84 BPM

## 2020-12-22 DIAGNOSIS — S52.532A CLOSED COLLES' FRACTURE OF LEFT RADIUS, INITIAL ENCOUNTER: Primary | ICD-10-CM

## 2020-12-22 PROCEDURE — 73110 X-RAY EXAM OF WRIST: CPT

## 2020-12-22 PROCEDURE — 99283 EMERGENCY DEPT VISIT LOW MDM: CPT

## 2020-12-22 PROCEDURE — 99284 EMERGENCY DEPT VISIT MOD MDM: CPT | Performed by: EMERGENCY MEDICINE

## 2020-12-29 ENCOUNTER — APPOINTMENT (OUTPATIENT)
Dept: RADIOLOGY | Facility: CLINIC | Age: 85
End: 2020-12-29
Payer: MEDICARE

## 2020-12-29 ENCOUNTER — OFFICE VISIT (OUTPATIENT)
Dept: OBGYN CLINIC | Facility: CLINIC | Age: 85
End: 2020-12-29
Payer: MEDICARE

## 2020-12-29 VITALS
HEART RATE: 82 BPM | SYSTOLIC BLOOD PRESSURE: 140 MMHG | DIASTOLIC BLOOD PRESSURE: 72 MMHG | WEIGHT: 125 LBS | BODY MASS INDEX: 23.6 KG/M2 | HEIGHT: 61 IN

## 2020-12-29 DIAGNOSIS — M25.532 LEFT WRIST PAIN: ICD-10-CM

## 2020-12-29 DIAGNOSIS — S52.572A OTHER CLOSED INTRA-ARTICULAR FRACTURE OF DISTAL END OF LEFT RADIUS, INITIAL ENCOUNTER: Primary | ICD-10-CM

## 2020-12-29 PROCEDURE — 73110 X-RAY EXAM OF WRIST: CPT

## 2020-12-29 PROCEDURE — 99203 OFFICE O/P NEW LOW 30 MIN: CPT | Performed by: ORTHOPAEDIC SURGERY

## 2020-12-29 PROCEDURE — 25600 CLTX DST RDL FX/EPHYS SEP WO: CPT | Performed by: ORTHOPAEDIC SURGERY

## 2020-12-29 RX ORDER — AMLODIPINE BESYLATE 10 MG/1
10 TABLET ORAL DAILY
COMMUNITY
Start: 2020-09-28

## 2021-01-19 ENCOUNTER — TELEPHONE (OUTPATIENT)
Dept: OBGYN CLINIC | Facility: HOSPITAL | Age: 86
End: 2021-01-19

## 2021-01-19 NOTE — TELEPHONE ENCOUNTER
Please advise the patients son Antoinette Call that we are not able to see the images and we do not just read a report and review that  We would have to be able to see the images to get a better picture of her fracture to see if there are any issues  Thank you

## 2021-01-19 NOTE — TELEPHONE ENCOUNTER
Christiano Adler is calling asking why patient's appt was a no show on 1/11/21 because Christiano Adler was told by his brother that we were going to do a virtual visit with patient instead on 1/11/21 instead  Christiano Adler says that her brother called us on 1/5/21 to let us know that there was covid at [address] that is when they were told that the patient was being switched to virtual   Christiano Adler states that the patient was supposed to have xrays & that they would get them to us prior to the video visit  Christiano Adler is unsure of what is going on at this time & is stating that there is a horrible lack of communication between AM & the family        [phone]

## 2021-01-19 NOTE — TELEPHONE ENCOUNTER
Patients son Uyen Mcrae called looking for information regarding patients XRAY report  He is asking if the Dr can review report from [facility]  He was advised that patient was to come into the office, but it is hard due to Catarinoid    Lena Givens - [phone]

## 2021-01-20 NOTE — TELEPHONE ENCOUNTER
Please call to him and informed him that he has to bring his mother from the facility to the appointment  They will not provide transportation  He can also coordinate the appointment time  Thank you

## 2021-01-25 ENCOUNTER — APPOINTMENT (OUTPATIENT)
Dept: RADIOLOGY | Facility: MEDICAL CENTER | Age: 86
End: 2021-01-25
Payer: MEDICARE

## 2021-01-25 ENCOUNTER — OFFICE VISIT (OUTPATIENT)
Dept: OCCUPATIONAL THERAPY | Facility: MEDICAL CENTER | Age: 86
End: 2021-01-25
Payer: MEDICARE

## 2021-01-25 ENCOUNTER — OFFICE VISIT (OUTPATIENT)
Dept: OBGYN CLINIC | Facility: MEDICAL CENTER | Age: 86
End: 2021-01-25

## 2021-01-25 VITALS — BODY MASS INDEX: 23.6 KG/M2 | WEIGHT: 125 LBS | HEIGHT: 61 IN

## 2021-01-25 DIAGNOSIS — S52.572A OTHER CLOSED INTRA-ARTICULAR FRACTURE OF DISTAL END OF LEFT RADIUS, INITIAL ENCOUNTER: Primary | ICD-10-CM

## 2021-01-25 DIAGNOSIS — S52.572A OTHER CLOSED INTRA-ARTICULAR FRACTURE OF DISTAL END OF LEFT RADIUS, INITIAL ENCOUNTER: ICD-10-CM

## 2021-01-25 PROCEDURE — 73110 X-RAY EXAM OF WRIST: CPT

## 2021-01-25 PROCEDURE — L3906 WHO W/O JOINTS CF: HCPCS

## 2021-01-25 PROCEDURE — 99024 POSTOP FOLLOW-UP VISIT: CPT | Performed by: ORTHOPAEDIC SURGERY

## 2021-01-25 RX ORDER — QUETIAPINE FUMARATE 25 MG/1
TABLET, FILM COATED ORAL
COMMUNITY
Start: 2021-01-13

## 2021-01-25 NOTE — PROGRESS NOTES
Orthosis    Diagnosis:   1  Other closed intra-articular fracture of distal end of left radius, initial encounter  Ambulatory referral to PT/OT hand therapy     Indication: Fracture    Location: Right  wrist and hand  Supplies: Custom Fit Orthotic  Orthosis type: Volar Hand-Wrist  Wearing Schedule: Remove for HEP  Describe Position: neutral       Precautions: Fracture    Patient or Caregiver expresses understanding of wearing Schedule and Precautions? Yes  Patient or Caregiver able to don/doff orthotic independently? Yes    Written orders provided to patient? Yes  Orders Obtained: Written  Orders Obtained from: Dr Brunilda Abdalla    Return for evaluation and treatment:  Patient to receive treatment at [facility]

## 2021-01-25 NOTE — PROGRESS NOTES
CHIEF COMPLAINT:  Chief Complaint   Patient presents with    Left Wrist - Follow-up       SUBJECTIVE:  Shyann Villegas is a 80y o  year old RHD female who presents for follow-up regarding left distal radius and ulnar fracture  Patient is now 6 weeks post initial injury, and 4 weeks post cast application    She reports no recent bruising, swelling, numbness, tingling, or feelings of instability in the cast       PAST MEDICAL HISTORY:  Past Medical History:   Diagnosis Date    Anxiety     [facility]     Depression     Diabetes mellitus ([facility] )     High cholesterol     Hypertension     Memory impairment     Osteopenia        PAST SURGICAL HISTORY:  Past Surgical History:   Procedure Laterality Date    APPENDECTOMY         FAMILY HISTORY:  Family History   Problem Relation Age of Onset    Dementia Father     Dementia Other        SOCIAL HISTORY:  Social History     Tobacco Use    Smoking status: Never Smoker    Smokeless tobacco: Never Used   Substance Use Topics    Alcohol use: Never     Frequency: Never     Drinks per session: Patient refused     Binge frequency: Never    Drug use: Never       MEDICATIONS:    Current Outpatient Medications:     acetaminophen (TYLENOL) 325 mg tablet, Take 3 tablets (975 mg total) by mouth every 8 (eight) hours, Disp: 30 tablet, Rfl: 0    amLODIPine (NORVASC) 10 mg tablet, Take 10 mg by mouth daily, Disp: , Rfl:     amLODIPine (NORVASC) 5 mg tablet, Take 2 tablets (10 mg total) by mouth daily, Disp: , Rfl: 0    aspirin 81 mg chewable tablet, Chew 81 mg daily, Disp: , Rfl:     atorvastatin (LIPITOR) 40 mg tablet, Take by mouth , Disp: , Rfl:     Calcium Carb-Cholecalciferol (CALTRATE 600+D) 600-800 MG-UNIT TABS, Take by mouth, Disp: , Rfl:     cholecalciferol (VITAMIN D3) 1,000 units tablet, Take 1,000 Units by mouth daily, Disp: , Rfl:     docusate sodium (COLACE) 100 mg capsule, Take 100 mg by mouth daily, Disp: , Rfl:    escitalopram (LEXAPRO) 10 mg tablet, Take 10 mg by mouth daily, Disp: , Rfl: 0    lisinopril (ZESTRIL) 40 mg tablet, Take 40 mg by mouth 2 (two) times a day, Disp: , Rfl:     Magnesium Oxide 400 MG CAPS, Take by mouth, Disp: , Rfl:     metFORMIN (GLUCOPHAGE) 500 mg tablet, Take 500 mg by mouth daily with dinner, Disp: , Rfl:     metoprolol succinate (TOPROL-XL) 25 mg 24 hr tablet, Take by mouth, Disp: , Rfl:     Multiple Vitamin (multivitamin) tablet, Take 1 tablet by mouth daily, Disp: , Rfl:     QUEtiapine (SEROquel) 25 mg tablet, , Disp: , Rfl:     ALLERGIES:  No Known Allergies    REVIEW OF SYSTEMS:  Review of Systems   Constitutional: Negative for fatigue  Gastrointestinal: Negative for nausea  Musculoskeletal:        As noted in HPI   Neurological: Negative for dizziness, weakness, numbness and headaches  All other systems reviewed and are negative  VITALS:  Vitals:       LABS:  HgA1c:   Lab Results   Component Value Date    HGBA1C 7 1 (H) 11/21/2020     BMP:   Lab Results   Component Value Date    CALCIUM 9 4 11/21/2020    K 3 8 11/21/2020    CO2 27 11/21/2020     11/21/2020    BUN 15 11/21/2020    CREATININE 0 71 11/21/2020       _____________________________________________________  PHYSICAL EXAMINATION:  General: well developed and well nourished, alert, oriented times 3 and appears comfortable  Psychiatric: Normal  HEENT: Trachea Midline, No torticollis  Pulmonary: No audible wheezing or respiratory distress   Skin: No masses, erythema, lacerations, fluctation, ulcerations  Neurovascular:  Motor Intact to the Median, Ulnar, Radial Nerve and Pulses Intact    MUSCULOSKELETAL EXAMINATION:  Left wrist -   Cast was removed at time of visit without difficulty  Skin is dry secondary to immobilization  Patient demonstrates stiffness and soreness with wrist flexion and extension secondary to immobilization  She is nontender to palpation over distal radius or ulnar styloid  2+ distal radial pulse with brisk capillary refill to the fingers  Radial, median, and ulnar motor and sensory distributions intact  Sensation light touch intact distally    ___________________________________________________  STUDIES REVIEWED:  Attending Physician has personally reviewed pertinent imaging in PACS, impression is as follows:    Review of radiographic series taken 1/25/2021 of the left wrist shows visible intra-articular distal radius fracture with evidence of bone bridging and callus formation consistent with routine healing      PROCEDURES PERFORMED:  Procedures  No Procedures performed today    _____________________________________________________  ASSESSMENT/PLAN:  Left distal radius and ulnar styloid fracture - 12/14/2020  · Patient referred to occupational therapy for custom volar splint  · Referral provided for occupational therapy to focus on range of motion  · Splint is to be worn at all times except for therapy and hygiene purposes  · Continue Tylenol as needed for pain and soreness    Follow Up:  Return in about 4 weeks (around 2/22/2021) for Re-evaluation  Work/school status:  Avoid heavy use of left upper extremity until cleared by physician    To Do Next Visit:  Re-evaluation of current issue      Scribe Attestation    I,:  Varinder Rebeccainter am acting as a scribe while in the presence of the attending physician :       I,:  Brandee Caballero MD personally performed the services described in this documentation    as scribed in my presence :           Portions of the record may have been created with voice recognition software  Occasional wrong word or "sound a like" substitutions may have occurred due to the inherent limitations of voice recognition software  Read the chart carefully and recognize, using context, where substitutions have occurred

## 2021-02-12 ENCOUNTER — TELEPHONE (OUTPATIENT)
Dept: OBGYN CLINIC | Facility: OTHER | Age: 86
End: 2021-02-12

## 2021-02-12 NOTE — TELEPHONE ENCOUNTER
Patients daughter called in requesting that next appointment on 02/22/2021 be a Virtual Visit  Daughter will call back with     Email : [email or web address]    C/b # [phone]

## 2021-02-19 NOTE — TELEPHONE ENCOUNTER
Priya Syed from [address] [place] [address] called in regards patient's virtual appointment on Monday  The best contact # for appointment:  [phone]  To contact Priya Syed please call:  [phone]

## 2021-02-22 ENCOUNTER — TELEMEDICINE (OUTPATIENT)
Dept: OBGYN CLINIC | Facility: MEDICAL CENTER | Age: 86
End: 2021-02-22

## 2021-02-22 VITALS — BODY MASS INDEX: 23.6 KG/M2 | WEIGHT: 125 LBS | HEIGHT: 61 IN

## 2021-02-22 DIAGNOSIS — S52.572D OTHER CLOSED INTRA-ARTICULAR FRACTURE OF DISTAL END OF LEFT RADIUS WITH ROUTINE HEALING, SUBSEQUENT ENCOUNTER: Primary | ICD-10-CM

## 2021-02-22 PROCEDURE — 99024 POSTOP FOLLOW-UP VISIT: CPT | Performed by: ORTHOPAEDIC SURGERY

## 2021-02-22 NOTE — LETTER
February 22, 2021     Patient: John Guerra   YOB: 1931   Date of Visit: 2/22/2021       To Whom it May Concern:    Canelo Valverde is under my professional care  She was treated via telephone visit  2/22/2021  She may discontinue splint and return to full WB LUE  If you have any questions or concerns, please don't hesitate to call           Sincerely,          Geraldine Maddox MD        CC: No Recipients

## 2021-02-22 NOTE — PROGRESS NOTES
CHIEF COMPLAINT:  Chief Complaint   Patient presents with    Left Wrist - Follow-up    Virtual Brief Visit       SUBJECTIVE:  Jason Daly is a 80y o  year old  female who was treated via telephone visit today  Pt  Is following up for left distal radius 12/14/2020 fracture sustained  treated in a short-arm cast and transitioned to a splint 01/25/2021  Patient is at a skilled nursing facility  Patient is being seen via telephone visit due to COVID-19  Patient is being seen with the assistance of Sharan mejia   The patient states that "her wrist is not healing"  Henok Arroyo states that she has been doing therapy and has maintained a sprain while not in therapy at all times     ROM and states that she is not complaining of any pain with motion of her wrist        PAST MEDICAL HISTORY:  Past Medical History:   Diagnosis Date    Anxiety     [facility]     Depression     [facility] mellitus ([facility] )     High cholesterol     Hypertension     Memory impairment     Osteopenia        PAST SURGICAL HISTORY:  Past Surgical History:   Procedure Laterality Date    APPENDECTOMY         FAMILY HISTORY:  Family History   Problem Relation Age of Onset    Dementia Father     Dementia Other        SOCIAL HISTORY:  Social History     Tobacco Use    Smoking status: Never Smoker    Smokeless tobacco: Never Used   Substance Use Topics    Alcohol use: Never     Frequency: Never     Drinks per session: Patient refused     Binge frequency: Never    Drug use: Never       MEDICATIONS:    Current Outpatient Medications:     acetaminophen (TYLENOL) 325 mg tablet, Take 3 tablets (975 mg total) by mouth every 8 (eight) hours, Disp: 30 tablet, Rfl: 0    amLODIPine (NORVASC) 10 mg tablet, Take 10 mg by mouth daily, Disp: , Rfl:     amLODIPine (NORVASC) 5 mg tablet, Take 2 tablets (10 mg total) by mouth daily, Disp: , Rfl: 0    aspirin 81 mg chewable tablet, Chew 81 mg daily, Disp: , Rfl:     atorvastatin (LIPITOR) 40 mg tablet, Take by mouth , Disp: , Rfl:     Calcium Carb-Cholecalciferol (CALTRATE 600+D) 600-800 MG-UNIT TABS, Take by mouth, Disp: , Rfl:     cholecalciferol (VITAMIN D3) 1,000 units tablet, Take 1,000 Units by mouth daily, Disp: , Rfl:     docusate sodium (COLACE) 100 mg capsule, Take 100 mg by mouth daily, Disp: , Rfl:     escitalopram (LEXAPRO) 10 mg tablet, Take 10 mg by mouth daily, Disp: , Rfl: 0    lisinopril (ZESTRIL) 40 mg tablet, Take 40 mg by mouth 2 (two) times a day, Disp: , Rfl:     Magnesium Oxide 400 MG CAPS, Take by mouth, Disp: , Rfl:     metFORMIN (GLUCOPHAGE) 500 mg tablet, Take 500 mg by mouth daily with dinner, Disp: , Rfl:     metoprolol succinate (TOPROL-XL) 25 mg 24 hr tablet, Take by mouth, Disp: , Rfl:     Multiple Vitamin (multivitamin) tablet, Take 1 tablet by mouth daily, Disp: , Rfl:     QUEtiapine (SEROquel) 25 mg tablet, , Disp: , Rfl:     ALLERGIES:  No Known Allergies      LABS:  HgA1c:   Lab Results   Component Value Date    HGBA1C 7 1 (H) 11/21/2020     BMP:   Lab Results   Component Value Date    CALCIUM 9 4 11/21/2020    K 3 8 11/21/2020    CO2 27 11/21/2020     11/21/2020    BUN 15 11/21/2020    CREATININE 0 71 11/21/2020       _____________________________________________________      MUSCULOSKELETAL EXAMINATION:   Left wrist and hand - Per Eusebio Cruz -  at facility  No swelling erythema or ecchymosis  Skin intact   Patient is able make full composite fist   Flexion extension ulnar and radial deviation without pain    ___________________________________________________  STUDIES REVIEWED:  No studies reviewed         PROCEDURES PERFORMED:  Procedures  No Procedures performed today    _____________________________________________________  ASSESSMENT/PLAN:    Left distal radius fracture -12/14/2020  - Pt was advised that she may dc splint and begin WBAT a letter was faxed to the facility  - Pt was advised to continue therapy  - Pt may follow up as needed     Follow Up:  Return if symptoms worsen or fail to improve  To Do Next Visit:  Re-evaluation of current issue    VIRTUAL VISIT Jamila Garces Dr acknowledges that she has consented to an online visit or consultation  She understands that the online visit is based solely on information provided by her, and that, in the absence of a face-to-face physical evaluation by the physician, the diagnosis she receives is both limited and provisional in terms of accuracy and completeness  This is not intended to replace a full medical face-to-face evaluation by the physician  Esha Carroll understands and accepts these terms      Scribe Attestation    I,:  Dima Lees am acting as a scribe while in the presence of the attending physician :       I,:  Flavia Greer MD personally performed the services described in this documentation    as scribed in my presence :

## 2022-07-03 ENCOUNTER — HOSPITAL ENCOUNTER (EMERGENCY)
Facility: HOSPITAL | Age: 87
Discharge: HOME | End: 2022-07-03
Attending: EMERGENCY MEDICINE
Payer: MEDICARE

## 2022-07-03 VITALS
HEART RATE: 75 BPM | TEMPERATURE: 98.6 F | OXYGEN SATURATION: 98 % | BODY MASS INDEX: 23.95 KG/M2 | WEIGHT: 126.76 LBS | DIASTOLIC BLOOD PRESSURE: 74 MMHG | SYSTOLIC BLOOD PRESSURE: 172 MMHG | RESPIRATION RATE: 16 BRPM

## 2022-07-03 DIAGNOSIS — Z13.9 ENCOUNTER FOR MEDICAL SCREENING EXAMINATION: ICD-10-CM

## 2022-07-03 DIAGNOSIS — F03.90 DEMENTIA WITHOUT BEHAVIORAL DISTURBANCE, UNSPECIFIED DEMENTIA TYPE: Primary | ICD-10-CM

## 2022-07-03 PROCEDURE — 99283 EMERGENCY DEPT VISIT LOW MDM: CPT

## 2022-07-03 PROCEDURE — 99281 EMR DPT VST MAYX REQ PHY/QHP: CPT | Performed by: EMERGENCY MEDICINE

## 2022-07-03 NOTE — ED PROVIDER NOTES
History  Chief Complaint   Patient presents with    Personal Problem     Pt has hx of dementia, eloped from [facility] this morning at 10am, denies any complaints, family wanted pt evaluated     26-year-old female, history of dementia, presenting after she eloped from an assisted living facility  Patient reported to be gone for about 6 hours  Patient is currently awake and alert, confused at baseline, unable to provide history  Patient denies any pain  History provided by:  Patient and EMS personnel  History limited by:  Dementia   used: No        Prior to Admission Medications   Prescriptions Last Dose Informant Patient Reported? Taking?    Calcium Carb-Cholecalciferol (CALTRATE 600+D) 600-800 MG-UNIT TABS  Family Member Yes No   Sig: Take by mouth   Magnesium Oxide 400 MG CAPS  Family Member Yes No   Sig: Take by mouth   Multiple Vitamin (multivitamin) tablet  Family Member Yes No   Sig: Take 1 tablet by mouth daily   QUEtiapine (SEROquel) 25 mg tablet  Family Member Yes No   acetaminophen (TYLENOL) 325 mg tablet  Family Member No No   Sig: Take 3 tablets (975 mg total) by mouth every 8 (eight) hours   amLODIPine (NORVASC) 10 mg tablet  Family Member Yes No   Sig: Take 10 mg by mouth daily   amLODIPine (NORVASC) 5 mg tablet  Family Member No No   Sig: Take 2 tablets (10 mg total) by mouth daily   aspirin 81 mg chewable tablet  Family Member Yes No   Sig: Chew 81 mg daily   atorvastatin (LIPITOR) 40 mg tablet  Family Member Yes No   Sig: Take by mouth    cholecalciferol (VITAMIN D3) 1,000 units tablet  Family Member Yes No   Sig: Take 1,000 Units by mouth daily   docusate sodium (COLACE) 100 mg capsule  Family Member Yes No   Sig: Take 100 mg by mouth daily   escitalopram (LEXAPRO) 10 mg tablet  Family Member Yes No   Sig: Take 10 mg by mouth daily   lisinopril (ZESTRIL) 40 mg tablet  Family Member Yes No   Sig: Take 40 mg by mouth 2 (two) times a day   metFORMIN (GLUCOPHAGE) 500 mg tablet  Family Member Yes No   Sig: Take 500 mg by mouth daily with dinner   metoprolol succinate (TOPROL-XL) 25 mg 24 hr tablet  Family Member Yes No   Sig: Take by mouth      Facility-Administered Medications: None       Past Medical History:   Diagnosis Date    Anxiety     [facility] ([facility] )     Depression     Diabetes mellitus ([facility] )     High cholesterol     Hypertension     Memory impairment     Osteopenia        Past Surgical History:   Procedure Laterality Date    APPENDECTOMY         Family History   Problem Relation Age of Onset    Dementia Father     Dementia Other      I have reviewed and agree with the history as documented  E-Cigarette/Vaping    E-Cigarette Use Never User      E-Cigarette/Vaping Substances    Nicotine No     THC No     CBD No     Flavoring No     Other No     Unknown No      Social History     Tobacco Use    Smoking status: Never Smoker    Smokeless tobacco: Never Used   Vaping Use    Vaping Use: Never used   Substance Use Topics    Alcohol use: Never    Drug use: Never       Review of Systems   Unable to perform ROS: Dementia       Physical Exam  Physical Exam  Vitals and nursing note reviewed  Constitutional:       General: She is not in acute distress  HENT:      Head: Normocephalic and atraumatic  Eyes:      Extraocular Movements: Extraocular movements intact  Pupils: Pupils are equal, round, and reactive to light  Cardiovascular:      Rate and Rhythm: Normal rate and regular rhythm  Pulmonary:      Effort: Pulmonary effort is normal       Breath sounds: Normal breath sounds  Abdominal:      Palpations: Abdomen is soft  Tenderness: There is no abdominal tenderness  Musculoskeletal:         General: No tenderness or signs of injury  Normal range of motion  Cervical back: Normal range of motion and neck supple  No rigidity or tenderness  Skin:     General: Skin is warm and dry     Neurological:      General: No focal deficit present  Mental Status: She is alert  She is disoriented  Motor: No weakness  Gait: Gait normal          Vital Signs  ED Triage Vitals [07/03/22 1612]   Temperature Pulse Respirations Blood Pressure SpO2   98 6 °F (37 °C) 75 16 (!) 172/74 98 %      Temp Source Heart Rate Source Patient Position - Orthostatic VS BP Location FiO2 (%)   Axillary Monitor Sitting Right arm --      Pain Score       --           Vitals:    07/03/22 1612   BP: (!) 172/74   Pulse: 75   Patient Position - Orthostatic VS: Sitting         Visual Acuity      ED Medications  Medications - No data to display    Diagnostic Studies  Results Reviewed     None                 No orders to display              Procedures  Procedures         ED Course  ED Course as of 07/03/22 1648   Sun Jul 03, 2022   1647 Daughter present, states patient had wandered away from her facility, they found her sitting in a stair well in an apartment  Patient currently at baseline mental status, she will take patient back to assisted living facility  SBIRT 22yo+    Flowsheet Row Most Recent Value   SBIRT (23 yo +)    In order to provide better care to our patients, we are screening all of our patients for alcohol and drug use  Would it be okay to ask you these screening questions? Unable to answer at this time Filed at: 07/03/2022 1612                    MDM  Number of Diagnoses or Management Options  Diagnosis management comments: 80-year-old female, presenting after eloping from her assisted living facility  Patient has dementia and is confused at baseline  Patient brought to ED for evaluation  Patient has no signs of injury, able to stand and ambulate without difficulty         Amount and/or Complexity of Data Reviewed  Review and summarize past medical records: yes        Disposition  Final diagnoses:   Dementia without behavioral disturbance, unspecified dementia type ([facility] )   Encounter for medical screening examination     Time reflects when diagnosis was documented in both MDM as applicable and the Disposition within this note     Time User Action Codes Description Comment    7/3/2022  4:44 PM Balinda Doom Add [F03 90] Dementia without behavioral disturbance, unspecified dementia type ([facility] )     7/3/2022  4:44 PM Balinda Doom Add [Z13 9] Encounter for medical screening examination       ED Disposition     ED Disposition   Discharge    Condition   Stable    Date/Time   Sun Jul 3, 2022  4:44 PM    [address] discharge to home/self care  Follow-up Information     Follow up With Specialties Details Why [address], MD Internal Medicine   [address]  [phone]            Patient's Medications   Discharge Prescriptions    No medications on file       No discharge procedures on file      PDMP Review       Value Time User    PDMP Reviewed  Yes 7/2/2020  3:45 PM Oskar Gallegos, [facility]          ED Provider  Electronically Signed by           Roberto Wright MD  07/03/22 5327

## 2023-12-01 NOTE — PLAN OF CARE
Internal Medicine Problem: PHYSICAL THERAPY ADULT  Goal: Performs mobility at highest level of function for planned discharge setting  See evaluation for individualized goals  Description  Treatment/Interventions: Functional transfer training, LE strengthening/ROM, Elevations, Therapeutic exercise, Endurance training, Patient/family training, Equipment eval/education, Bed mobility, Gait training, Spoke to nursing, OT  Equipment Recommended: Be [place]       See flowsheet documentation for full assessment, interventions and recommendations  Note:   Prognosis: Good  Problem List: Decreased strength, Decreased endurance, Impaired balance, Decreased mobility, Impaired judgement, Decreased safety awareness, Decreased cognition  Assessment: Pt is an 80 y o  female presenting to [place] [address] as a trauma transfer from [facility] s/p fall  Pt does not recall events of fall  Pt was admitted with a primary diagnosis of Intraparenchymal hematoma of left side of brain due to trauma and active problems including facial ecchymosis  Pt's PMH affecting eval includes high cholesterol, HTN, CVA and personal factors including impaired cognition causing pt to be poor historian and steps to negotiate at home per pt report  Pt seen for high complexity PT eval due to ongoing medical management of admitting diagnosis, impaired cognition, multiple lines, decreased functional ability compared to baseline and fall risk  Upon eval, pt was awake resting in bed  Pt required Min Ax1 with bed mobility, transfers and ambulation  Pt ambulated 60 ft x 2 with rolling walker and VCs to keep walker closer for safety  Based on evaluation, pt's current list of problems includes decreased b/l LE strength, decreased endurance, impaired balance, decreased mobility, decreased cognition, judgment and safety awareness  PT will continue to follow pt for remainder of hospital stay to address these impairments   At conclusion of evaluation, pt was left up in chair Surgery with all needs within reach and chair alarm on  PT currently recommending rehab at D/C  PT will follow up with case management regarding social history and prior level of function due to pt being poor historian at this time  Barriers to Discharge: Decreased caregiver support, Inaccessible home environment     PT Discharge Recommendation: [address]     PT - OK to Discharge: Yes(To rehab when medically cleared )    See flowsheet documentation for full assessment  Bariatric Surgery

## 2025-07-25 NOTE — ED NOTES
A catheter was exchanged for a (CATHETER OD6 FR L100 CM RADOPQ BRAID SLCT JL3.5 CRV COR FEM) catheter. PT awake and alert, no distress noted  No other questions upon d/c       April Galina Schmitt RN  07/01/20 5078